# Patient Record
Sex: MALE | Race: WHITE | NOT HISPANIC OR LATINO | Employment: OTHER | URBAN - METROPOLITAN AREA
[De-identification: names, ages, dates, MRNs, and addresses within clinical notes are randomized per-mention and may not be internally consistent; named-entity substitution may affect disease eponyms.]

---

## 2024-10-04 ENCOUNTER — APPOINTMENT (OUTPATIENT)
Dept: RADIOLOGY | Facility: HOSPITAL | Age: 69
End: 2024-10-04
Payer: MEDICARE

## 2024-10-04 ENCOUNTER — HOSPITAL ENCOUNTER (OUTPATIENT)
Facility: HOSPITAL | Age: 69
Setting detail: OBSERVATION
End: 2024-10-04
Attending: EMERGENCY MEDICINE | Admitting: INTERNAL MEDICINE
Payer: MEDICARE

## 2024-10-04 DIAGNOSIS — I10 PRIMARY HYPERTENSION: ICD-10-CM

## 2024-10-04 DIAGNOSIS — M15.0 PRIMARY OSTEOARTHRITIS INVOLVING MULTIPLE JOINTS: ICD-10-CM

## 2024-10-04 DIAGNOSIS — R26.2 UNABLE TO AMBULATE: ICD-10-CM

## 2024-10-04 DIAGNOSIS — M17.12 PRIMARY OSTEOARTHRITIS OF LEFT KNEE: Primary | ICD-10-CM

## 2024-10-04 PROBLEM — M19.90 OSTEOARTHRITIS: Status: ACTIVE | Noted: 2024-10-04

## 2024-10-04 LAB
ALBUMIN SERPL BCP-MCNC: 3.9 G/DL (ref 3.4–5)
ALP SERPL-CCNC: 66 U/L (ref 33–136)
ALT SERPL W P-5'-P-CCNC: 16 U/L (ref 10–52)
ANION GAP SERPL CALC-SCNC: 12 MMOL/L (ref 10–20)
APPEARANCE UR: CLEAR
AST SERPL W P-5'-P-CCNC: 15 U/L (ref 9–39)
BASOPHILS # BLD AUTO: 0.04 X10*3/UL (ref 0–0.1)
BASOPHILS NFR BLD AUTO: 0.7 %
BILIRUB SERPL-MCNC: 0.8 MG/DL (ref 0–1.2)
BILIRUB UR STRIP.AUTO-MCNC: NEGATIVE MG/DL
BUN SERPL-MCNC: 14 MG/DL (ref 6–23)
CALCIUM SERPL-MCNC: 8.7 MG/DL (ref 8.6–10.3)
CHLORIDE SERPL-SCNC: 102 MMOL/L (ref 98–107)
CO2 SERPL-SCNC: 24 MMOL/L (ref 21–32)
COLOR UR: YELLOW
CREAT SERPL-MCNC: 1 MG/DL (ref 0.5–1.3)
EGFRCR SERPLBLD CKD-EPI 2021: 82 ML/MIN/1.73M*2
EOSINOPHIL # BLD AUTO: 0.01 X10*3/UL (ref 0–0.7)
EOSINOPHIL NFR BLD AUTO: 0.2 %
ERYTHROCYTE [DISTWIDTH] IN BLOOD BY AUTOMATED COUNT: 13.8 % (ref 11.5–14.5)
GLUCOSE SERPL-MCNC: 108 MG/DL (ref 74–99)
GLUCOSE UR STRIP.AUTO-MCNC: NORMAL MG/DL
HCT VFR BLD AUTO: 43.2 % (ref 41–52)
HGB BLD-MCNC: 14.5 G/DL (ref 13.5–17.5)
IMM GRANULOCYTES # BLD AUTO: 0.02 X10*3/UL (ref 0–0.7)
IMM GRANULOCYTES NFR BLD AUTO: 0.3 % (ref 0–0.9)
KETONES UR STRIP.AUTO-MCNC: NEGATIVE MG/DL
LEUKOCYTE ESTERASE UR QL STRIP.AUTO: NEGATIVE
LYMPHOCYTES # BLD AUTO: 0.88 X10*3/UL (ref 1.2–4.8)
LYMPHOCYTES NFR BLD AUTO: 14.9 %
MCH RBC QN AUTO: 29.5 PG (ref 26–34)
MCHC RBC AUTO-ENTMCNC: 33.6 G/DL (ref 32–36)
MCV RBC AUTO: 88 FL (ref 80–100)
MONOCYTES # BLD AUTO: 0.49 X10*3/UL (ref 0.1–1)
MONOCYTES NFR BLD AUTO: 8.3 %
NEUTROPHILS # BLD AUTO: 4.47 X10*3/UL (ref 1.2–7.7)
NEUTROPHILS NFR BLD AUTO: 75.6 %
NITRITE UR QL STRIP.AUTO: NEGATIVE
NRBC BLD-RTO: 0 /100 WBCS (ref 0–0)
PH UR STRIP.AUTO: 6.5 [PH]
PLATELET # BLD AUTO: 186 X10*3/UL (ref 150–450)
POTASSIUM SERPL-SCNC: 3.6 MMOL/L (ref 3.5–5.3)
PROT SERPL-MCNC: 6.9 G/DL (ref 6.4–8.2)
PROT UR STRIP.AUTO-MCNC: NEGATIVE MG/DL
RBC # BLD AUTO: 4.92 X10*6/UL (ref 4.5–5.9)
RBC # UR STRIP.AUTO: NEGATIVE /UL
SODIUM SERPL-SCNC: 134 MMOL/L (ref 136–145)
SP GR UR STRIP.AUTO: 1.01
UROBILINOGEN UR STRIP.AUTO-MCNC: ABNORMAL MG/DL
WBC # BLD AUTO: 5.9 X10*3/UL (ref 4.4–11.3)

## 2024-10-04 PROCEDURE — 2500000001 HC RX 250 WO HCPCS SELF ADMINISTERED DRUGS (ALT 637 FOR MEDICARE OP)

## 2024-10-04 PROCEDURE — 2500000005 HC RX 250 GENERAL PHARMACY W/O HCPCS

## 2024-10-04 PROCEDURE — 85025 COMPLETE CBC W/AUTO DIFF WBC: CPT

## 2024-10-04 PROCEDURE — 97161 PT EVAL LOW COMPLEX 20 MIN: CPT | Mod: GP

## 2024-10-04 PROCEDURE — G0378 HOSPITAL OBSERVATION PER HR: HCPCS

## 2024-10-04 PROCEDURE — 2500000004 HC RX 250 GENERAL PHARMACY W/ HCPCS (ALT 636 FOR OP/ED)

## 2024-10-04 PROCEDURE — 73560 X-RAY EXAM OF KNEE 1 OR 2: CPT | Mod: LEFT SIDE | Performed by: RADIOLOGY

## 2024-10-04 PROCEDURE — 96372 THER/PROPH/DIAG INJ SC/IM: CPT

## 2024-10-04 PROCEDURE — 84075 ASSAY ALKALINE PHOSPHATASE: CPT

## 2024-10-04 PROCEDURE — 73560 X-RAY EXAM OF KNEE 1 OR 2: CPT | Mod: LT

## 2024-10-04 PROCEDURE — 36415 COLL VENOUS BLD VENIPUNCTURE: CPT

## 2024-10-04 PROCEDURE — 99285 EMERGENCY DEPT VISIT HI MDM: CPT

## 2024-10-04 PROCEDURE — 2500000001 HC RX 250 WO HCPCS SELF ADMINISTERED DRUGS (ALT 637 FOR MEDICARE OP): Performed by: INTERNAL MEDICINE

## 2024-10-04 PROCEDURE — 99223 1ST HOSP IP/OBS HIGH 75: CPT

## 2024-10-04 PROCEDURE — 80053 COMPREHEN METABOLIC PANEL: CPT

## 2024-10-04 PROCEDURE — 81003 URINALYSIS AUTO W/O SCOPE: CPT

## 2024-10-04 PROCEDURE — 97165 OT EVAL LOW COMPLEX 30 MIN: CPT | Mod: GO

## 2024-10-04 RX ORDER — HYDROMORPHONE HYDROCHLORIDE 0.2 MG/ML
0.2 INJECTION INTRAMUSCULAR; INTRAVENOUS; SUBCUTANEOUS EVERY 6 HOURS PRN
Status: ACTIVE | OUTPATIENT
Start: 2024-10-04

## 2024-10-04 RX ORDER — CETIRIZINE HYDROCHLORIDE 10 MG/1
5 TABLET ORAL 2 TIMES DAILY
Status: DISPENSED | OUTPATIENT
Start: 2024-10-04

## 2024-10-04 RX ORDER — TRIAMCINOLONE ACETONIDE 1 MG/G
1 CREAM TOPICAL 2 TIMES DAILY
Status: ON HOLD | COMMUNITY
Start: 2024-10-03

## 2024-10-04 RX ORDER — KETOROLAC TROMETHAMINE 30 MG/ML
15 INJECTION, SOLUTION INTRAMUSCULAR; INTRAVENOUS ONCE
Status: COMPLETED | OUTPATIENT
Start: 2024-10-04 | End: 2024-10-04

## 2024-10-04 RX ORDER — ACETAMINOPHEN 325 MG/1
650 TABLET ORAL EVERY 4 HOURS PRN
Status: DISCONTINUED | OUTPATIENT
Start: 2024-10-04 | End: 2024-10-04

## 2024-10-04 RX ORDER — INSULIN LISPRO 100 [IU]/ML
0-10 INJECTION, SOLUTION INTRAVENOUS; SUBCUTANEOUS
Status: DISPENSED | OUTPATIENT
Start: 2024-10-04

## 2024-10-04 RX ORDER — ATORVASTATIN CALCIUM 40 MG/1
40 TABLET, FILM COATED ORAL NIGHTLY
Status: DISPENSED | OUTPATIENT
Start: 2024-10-04

## 2024-10-04 RX ORDER — TRIAMCINOLONE ACETONIDE 1 MG/G
1 CREAM TOPICAL 2 TIMES DAILY
Status: DISPENSED | OUTPATIENT
Start: 2024-10-04

## 2024-10-04 RX ORDER — OXYCODONE AND ACETAMINOPHEN 5; 325 MG/1; MG/1
1 TABLET ORAL EVERY 6 HOURS PRN
Status: ACTIVE | OUTPATIENT
Start: 2024-10-04

## 2024-10-04 RX ORDER — PREDNISONE 10 MG/1
TABLET ORAL SEE ADMIN INSTRUCTIONS
Status: ON HOLD | COMMUNITY
Start: 2024-10-03 | End: 2024-10-11

## 2024-10-04 RX ORDER — ENOXAPARIN SODIUM 100 MG/ML
40 INJECTION SUBCUTANEOUS EVERY 12 HOURS SCHEDULED
Status: DISPENSED | OUTPATIENT
Start: 2024-10-04

## 2024-10-04 RX ORDER — POLYETHYLENE GLYCOL 3350 17 G/17G
17 POWDER, FOR SOLUTION ORAL DAILY PRN
Status: ACTIVE | OUTPATIENT
Start: 2024-10-04

## 2024-10-04 RX ORDER — ACETAMINOPHEN 160 MG/5ML
650 SOLUTION ORAL EVERY 4 HOURS PRN
Status: DISCONTINUED | OUTPATIENT
Start: 2024-10-04 | End: 2024-10-04

## 2024-10-04 RX ORDER — ACETAMINOPHEN 650 MG/1
650 SUPPOSITORY RECTAL EVERY 4 HOURS
Status: ACTIVE | OUTPATIENT
Start: 2024-10-04

## 2024-10-04 RX ORDER — LIDOCAINE 560 MG/1
1 PATCH PERCUTANEOUS; TOPICAL; TRANSDERMAL ONCE
Status: COMPLETED | OUTPATIENT
Start: 2024-10-04 | End: 2024-10-04

## 2024-10-04 RX ORDER — ACETAMINOPHEN 325 MG/1
650 TABLET ORAL EVERY 4 HOURS
Status: DISPENSED | OUTPATIENT
Start: 2024-10-04

## 2024-10-04 RX ORDER — ACETAMINOPHEN 325 MG/1
975 TABLET ORAL ONCE
Status: COMPLETED | OUTPATIENT
Start: 2024-10-04 | End: 2024-10-04

## 2024-10-04 RX ORDER — ACETAMINOPHEN 160 MG/5ML
650 SOLUTION ORAL EVERY 4 HOURS
Status: ACTIVE | OUTPATIENT
Start: 2024-10-04

## 2024-10-04 RX ORDER — ACETAMINOPHEN 650 MG/1
650 SUPPOSITORY RECTAL EVERY 4 HOURS PRN
Status: DISCONTINUED | OUTPATIENT
Start: 2024-10-04 | End: 2024-10-04

## 2024-10-04 RX ORDER — DICLOFENAC SODIUM 10 MG/G
4 GEL TOPICAL 4 TIMES DAILY PRN
Status: DISPENSED | OUTPATIENT
Start: 2024-10-04

## 2024-10-04 RX ADMIN — DICLOFENAC SODIUM 4 G: 10 GEL TOPICAL at 21:28

## 2024-10-04 RX ADMIN — ACETAMINOPHEN 975 MG: 325 TABLET ORAL at 10:37

## 2024-10-04 RX ADMIN — KETOROLAC TROMETHAMINE 15 MG: 30 INJECTION, SOLUTION INTRAMUSCULAR at 10:37

## 2024-10-04 RX ADMIN — CETIRIZINE HYDROCHLORIDE 5 MG: 10 TABLET, FILM COATED ORAL at 21:30

## 2024-10-04 RX ADMIN — ENOXAPARIN SODIUM 40 MG: 40 INJECTION SUBCUTANEOUS at 21:28

## 2024-10-04 RX ADMIN — LIDOCAINE 4% 1 PATCH: 40 PATCH TOPICAL at 10:37

## 2024-10-04 RX ADMIN — DICLOFENAC SODIUM 4 G: 10 GEL TOPICAL at 21:29

## 2024-10-04 RX ADMIN — ACETAMINOPHEN 650 MG: 325 TABLET ORAL at 21:30

## 2024-10-04 RX ADMIN — ATORVASTATIN CALCIUM 40 MG: 40 TABLET, FILM COATED ORAL at 21:28

## 2024-10-04 SDOH — SOCIAL STABILITY: SOCIAL INSECURITY: WERE YOU ABLE TO COMPLETE ALL THE BEHAVIORAL HEALTH SCREENINGS?: YES

## 2024-10-04 SDOH — SOCIAL STABILITY: SOCIAL INSECURITY: ABUSE: ADULT

## 2024-10-04 SDOH — SOCIAL STABILITY: SOCIAL INSECURITY: DO YOU FEEL ANYONE HAS EXPLOITED OR TAKEN ADVANTAGE OF YOU FINANCIALLY OR OF YOUR PERSONAL PROPERTY?: NO

## 2024-10-04 SDOH — SOCIAL STABILITY: SOCIAL INSECURITY: DO YOU FEEL UNSAFE GOING BACK TO THE PLACE WHERE YOU ARE LIVING?: NO

## 2024-10-04 SDOH — SOCIAL STABILITY: SOCIAL INSECURITY: HAVE YOU HAD THOUGHTS OF HARMING ANYONE ELSE?: NO

## 2024-10-04 SDOH — SOCIAL STABILITY: SOCIAL INSECURITY: HAVE YOU HAD ANY THOUGHTS OF HARMING ANYONE ELSE?: NO

## 2024-10-04 SDOH — SOCIAL STABILITY: SOCIAL INSECURITY: DOES ANYONE TRY TO KEEP YOU FROM HAVING/CONTACTING OTHER FRIENDS OR DOING THINGS OUTSIDE YOUR HOME?: NO

## 2024-10-04 SDOH — SOCIAL STABILITY: SOCIAL INSECURITY: HAS ANYONE EVER THREATENED TO HURT YOUR FAMILY OR YOUR PETS?: NO

## 2024-10-04 SDOH — SOCIAL STABILITY: SOCIAL INSECURITY: ARE THERE ANY APPARENT SIGNS OF INJURIES/BEHAVIORS THAT COULD BE RELATED TO ABUSE/NEGLECT?: NO

## 2024-10-04 SDOH — SOCIAL STABILITY: SOCIAL INSECURITY: ARE YOU OR HAVE YOU BEEN THREATENED OR ABUSED PHYSICALLY, EMOTIONALLY, OR SEXUALLY BY ANYONE?: NO

## 2024-10-04 ASSESSMENT — COGNITIVE AND FUNCTIONAL STATUS - GENERAL
DAILY ACTIVITIY SCORE: 13
TOILETING: A LITTLE
TOILETING: A LOT
MOVING TO AND FROM BED TO CHAIR: A LOT
CLIMB 3 TO 5 STEPS WITH RAILING: TOTAL
DRESSING REGULAR LOWER BODY CLOTHING: A LOT
TURNING FROM BACK TO SIDE WHILE IN FLAT BAD: A LOT
DAILY ACTIVITIY SCORE: 19
WALKING IN HOSPITAL ROOM: TOTAL
DRESSING REGULAR UPPER BODY CLOTHING: A LITTLE
EATING MEALS: A LITTLE
MOBILITY SCORE: 11
HELP NEEDED FOR BATHING: A LOT
MOBILITY SCORE: 10
STANDING UP FROM CHAIR USING ARMS: A LOT
MOVING FROM LYING ON BACK TO SITTING ON SIDE OF FLAT BED WITH BEDRAILS: A LITTLE
DRESSING REGULAR UPPER BODY CLOTHING: A LOT
STANDING UP FROM CHAIR USING ARMS: A LOT
PERSONAL GROOMING: A LITTLE
MOVING TO AND FROM BED TO CHAIR: A LOT
DRESSING REGULAR LOWER BODY CLOTHING: TOTAL
PATIENT BASELINE BEDBOUND: NO
CLIMB 3 TO 5 STEPS WITH RAILING: TOTAL
WALKING IN HOSPITAL ROOM: TOTAL
TURNING FROM BACK TO SIDE WHILE IN FLAT BAD: A LOT
HELP NEEDED FOR BATHING: A LITTLE
MOVING FROM LYING ON BACK TO SITTING ON SIDE OF FLAT BED WITH BEDRAILS: A LOT

## 2024-10-04 ASSESSMENT — LIFESTYLE VARIABLES
TOTAL SCORE: 0
EVER FELT BAD OR GUILTY ABOUT YOUR DRINKING: NO
HOW OFTEN DO YOU HAVE 6 OR MORE DRINKS ON ONE OCCASION: NEVER
AUDIT-C TOTAL SCORE: 0
HAVE PEOPLE ANNOYED YOU BY CRITICIZING YOUR DRINKING: NO
AUDIT-C TOTAL SCORE: 0
SUBSTANCE_ABUSE_PAST_12_MONTHS: NO
HOW MANY STANDARD DRINKS CONTAINING ALCOHOL DO YOU HAVE ON A TYPICAL DAY: PATIENT DOES NOT DRINK
PRESCIPTION_ABUSE_PAST_12_MONTHS: NO
EVER HAD A DRINK FIRST THING IN THE MORNING TO STEADY YOUR NERVES TO GET RID OF A HANGOVER: NO
SKIP TO QUESTIONS 9-10: 1
HAVE YOU EVER FELT YOU SHOULD CUT DOWN ON YOUR DRINKING: NO
HOW OFTEN DO YOU HAVE A DRINK CONTAINING ALCOHOL: NEVER

## 2024-10-04 ASSESSMENT — ACTIVITIES OF DAILY LIVING (ADL)
GROOMING: INDEPENDENT
TOILETING: NEEDS ASSISTANCE
DRESSING YOURSELF: NEEDS ASSISTANCE
ASSISTIVE_DEVICE: WALKER
JUDGMENT_ADEQUATE_SAFELY_COMPLETE_DAILY_ACTIVITIES: YES
ADEQUATE_TO_COMPLETE_ADL: YES
BATHING: NEEDS ASSISTANCE
LACK_OF_TRANSPORTATION: NO
WALKS IN HOME: NEEDS ASSISTANCE
HEARING - RIGHT EAR: FUNCTIONAL
FEEDING YOURSELF: INDEPENDENT
HEARING - LEFT EAR: FUNCTIONAL
PATIENT'S MEMORY ADEQUATE TO SAFELY COMPLETE DAILY ACTIVITIES?: YES

## 2024-10-04 ASSESSMENT — PATIENT HEALTH QUESTIONNAIRE - PHQ9
1. LITTLE INTEREST OR PLEASURE IN DOING THINGS: NOT AT ALL
SUM OF ALL RESPONSES TO PHQ9 QUESTIONS 1 & 2: 0
2. FEELING DOWN, DEPRESSED OR HOPELESS: NOT AT ALL

## 2024-10-04 ASSESSMENT — COLUMBIA-SUICIDE SEVERITY RATING SCALE - C-SSRS
6. HAVE YOU EVER DONE ANYTHING, STARTED TO DO ANYTHING, OR PREPARED TO DO ANYTHING TO END YOUR LIFE?: NO
2. HAVE YOU ACTUALLY HAD ANY THOUGHTS OF KILLING YOURSELF?: NO
1. IN THE PAST MONTH, HAVE YOU WISHED YOU WERE DEAD OR WISHED YOU COULD GO TO SLEEP AND NOT WAKE UP?: NO

## 2024-10-04 NOTE — ED PROVIDER NOTES
"Emergency Department Provider Note        History of Present Illness     History provided by: Patient  Limitations to History: None  External Records Reviewed with Brief Summary: Outpatient progress note from 10/3/24 which showed visit with PCP with noted L knee weakness, determined 2/2 cerebral palsy and brace ordered    HPI:  Dez Bonilla is a 69 y.o. male with PMHx palsy, T2DM, osteoarthritis, HLD who presents to the emergency department for left knee pain starting this morning.  Per patient, he has some chronic knee pain, however this morning when he woke up his pain was more severe and he was unable to walk.  He reports he feels like the knee may buckle underneath him.  He reports no recent trauma or injuries.  Patient walks with a walker at baseline.  Patient does state that he \"has no cartilage in that knee.\"  Denies prior surgeries or procedures on the knee.    While resting in bed patient reports no pain, however reports that when trying to stand pain was 9.5/10.    No fever/chills at home, no cough/congestion, no shortness of breath, no chest pain, no nausea/vomiting, no abdominal pain, no dysuria, no leg swelling.  Does report that he had his COVID and flu shots yesterday.    Physical Exam   Triage vitals:  T 37 °C (98.6 °F)  HR 92  /81  RR 18  O2 95 %      Physical exam:   Triage vitals reviewed.  Constitutional: Well developed adult in no acute distress, non toxic in appearance  Head: Normocephalic, atraumatic  Skin: Intact, dry. No rashes or lesions.  Eyes: Pupils are equal, reactive to light bilaterally. EOMI without nystagmus. No conjunctival injection.  Neck: Supple. Trachea is midline.  Pulmonary: Normal work of breathing with no accessory muscle use noted.  Clear to auscultation bilaterally.   Cardiovascular: Normal rate, regular rhythm. No murmurs/gallops/rubs appreciated. 2+ radial and DP pulses bilaterally.   Abdomen: Soft, nondistended. Nontender to palpation.  Extremities: No " gross deformities.  Strength 5/5 in BUE and RLE, strength 4+/5 in LLE hip and knee, 5/5 plantarflexion and dorsiflexion.  ROM of left knee intact, nonpainful without weightbearing.  No tenderness to palpation.  Neuro: Awake and alert. Answers all questions appropriately. Speech is clear. Face is symmetric without facial droop and facial sensation to light touch equal bilaterally. Uvula midline. Tongue protrusion midline. Hearing intact bilaterally. Full and equal shoulder shrug. Sensation to light touch intact in all four extremities. No pronator drift.      Medical Decision Making & ED Course   Medical Decision Makin y.o. male with PMHx cerebral palsy, T2DM who presents to ED for atraumatic L knee pain.  On arrival, the patient was afebrile and hemodynamically stable.  On exam, he is able to range the left knee as long as he does not put any weight on it.  LLE at the hip flexor and knee is 4+/5, plantarflexion/dorsiflexion 5/5.     Given inability to bear weight, will obtain plain films of the left knee. Will trial tylenol, toradol, lido patch and reambulation.    Low concern for septic joint as patient is able to range the knee while not bearing weight and has no tenderness to palpation, no erythema or edema. Low concern for stroke as patient does have distal strength intact and no other neurologic deficits on exam.     Patient's sister at bedside to give additional history.  She believes the patient is not taking good care of himself at home.  We have previously discussed placement in assisted living with the patient's PCP.  Basic labs obtained as well as UA due to sister's report of increasing confusion versus inability to care for self at home.    Social work was consulted and came to bedside to evaluate the patient and family.  Patient and family were provided with resources, as well as paperwork to establish healthcare power of .     PT/OT eval the patient and recommended SNF with moderate  intensity therapy.  Patient is in agreement with placement.    Discussed with Dr. Crowley, patient to be admitted for observation with plan for PT/OT and placement.    ----    Differential diagnoses considered include but are not limited to: Fracture, osteoarthritis, inflammatory arthritis, septic joint, metabolic derangement     Social Determinants of Health which Significantly Impact Care: Difficulty paying for/obtaining medications, Housing insecurity, and Poor housing conditions The following actions were taken to address these social determinants: Patient offered evaluation by Social Work    EKG Independent Interpretation: EKG not obtained    Independent Result Review and Interpretation: Relevant laboratory and radiographic results were reviewed and independently interpreted by myself.  As necessary, they are commented on in the ED Course.    Chronic conditions affecting the patient's care: As documented above in [facility]    The patient was discussed with the following consultants/services: Hospitalist/Admitting Provider who accepted the patient for admission and Social work , PT/OT    Care Considerations: As documented above in [facility]    ED Course:  ED Course as of 10/04/24 1640   Fri Oct 04, 2024   1216 XR knee left 1-2 views  CXR with marked vdegenerative changes, no fracture or subluxation. [HH]   1521 OT/PT recommended moderate intensity, PT recommended staff assist x2 for transfers [HH]   1558 CBC, CMP, and UA unremarkable [HH]      ED Course User Index  [HH] Deb Hansen MD         Diagnoses as of 10/04/24 1640   Primary osteoarthritis of left knee   Unable to ambulate     Disposition   As a result of their workup, the patient will require admission to the hospital.  The patient was informed of his diagnosis.  The patient was given the opportunity to ask questions and I answered them. The patient agreed to be admitted to the hospital.      Patient seen and discussed with ED attending physician.    Deb  MD [facility]  Emergency Medicine     Deb Hansen MD  Resident  10/04/24 1640

## 2024-10-04 NOTE — H&P
"  Subjective/History     Dez Bonilla is a 69 y.o. male with a history of cerebral palsy, developmental delay, HLD, osteoarthritis, testosterone deficiency, and T2DM who presented to the ED for left knee pain starting this morning.  He has chronic knee pain however this morning when he woke up the pain became more severe and he was unable to walk.  When trying to stand patient reports 9.5/10 pain. Per patient's sister, there is concern for bedbug infestation in his apartment.      ED Course: ED vitals unremarkable.  Labs notable for Na 134, UA notable for 1+ urobilinogen.  Patient was placed on contact plus isolation for bedbug infestation.    Surgical history: As below  Family history: As below  Risk/Social factors:     No past medical history on file.    No past surgical history on file.    Family history:family history is not on file.    ROS  A 12 point review of systems was performed and all systems were negative/normal except what is noted in the HPI. Please refer to the HPI for details.    Objective     Physical Exam:  General:  Pleasant and cooperative. No apparent distress.  HEENT:  Normocephalic, atraumatic  Chest:  Clear to auscultation bilaterally. No wheezes, rales, or rhonchi.  CV:  Regular rate and rhythm. No murmurs    Abdomen: Abdomen is soft, non-tender, non-distended. BS +   Extremities:  No lower extremity edema or cyanosis.   Neurological:  AAOx3. No focal deficits.  Skin:  Warm and dry. Red papular rash noted over arms, as well as white macules.    Last Recorded Vitals  Blood pressure 140/81, pulse 92, temperature 37 °C (98.6 °F), resp. rate 18, height 1.651 m (5' 5\"), weight 113 kg (250 lb), SpO2 95%.  Intake/Output last 3 Shifts:  No intake/output data recorded.    Last CBC & BMP  Lab Results   Component Value Date    GLUCOSE 108 (H) 10/04/2024    CALCIUM 8.7 10/04/2024     (L) 10/04/2024    K 3.6 10/04/2024    CO2 24 10/04/2024     10/04/2024    BUN 14 10/04/2024    CREATININE " "1.00 10/04/2024     Lab Results   Component Value Date    WBC 5.9 10/04/2024    HGB 14.5 10/04/2024    HCT 43.2 10/04/2024    MCV 88 10/04/2024     10/04/2024         Assessment / Plan          Acute Medical conditions    #Reduced mobility of L knee in the setting of osteoarthritis and cerebral palsy  Plan:  -Patient reports that his left knee \"won't lock.\" He says that he has severe pain when attempting to stand on his leg, but has no pain at bedrest or on palpation.  - PT/OT eval ordered, PT score 10, OT score 13.   met with patient's sister to discuss assisted living placement and becoming POA.  - Patient given lidocaine patch, scheduled tylenol for knee pain. PRN percocet and dilaudid for moderate and severe pain respectively. Voltaren QID PRN ordered.    #Red papular rash over BUE  #White macules over BUE  #C/f bedbug infestation  Plan:  -Patient presents with a red rash over his arms.  Sister notes that he has bedbugs in his apartment and hoards objects.  She also notes that he does not follow typical hygiene standards.  - Primary care doctor was seen yesterday, prescribed a course of topical Kenalog.  Continuing Kenalog BID.      Chronic Medical conditions  #T2DM  #HLD  Plan:  -Patient states he was taking glipizide, lisinopril, and atorvastatin.  Per chart review he has not taken these in 6 months due to transportation issues to the pharmacy.   - HbA1c was rechecked at outpatient visit yesterday, 5.9%.  - Lipid profile shows , non-.  Resuming atorvastatin 40mg.  - SSI 2 lispro started, monitor glucose.  - Patient also has prescriptions for lisinopril 20mg daily, glipizide 10mg daily, and Lasix 20mg daily.    DVT: Lovenox  IVF: -  Diet: Adult carb controlled  Code: DNR  Consults:     Care Planning/PT-OT: PT/OT eval ordered       Noel Bunch MD   PGY-1, Internal Medicine  This is a preliminary note, please await attending attestation for final A/P    "

## 2024-10-04 NOTE — PROGRESS NOTES
Pharmacy Medication History Review    Dez Bonilla is a 69 y.o. male admitted for No Principal Problem: There is no principal problem currently on the Problem List. Please update the Problem List and refresh.. Pharmacy reviewed the patient's [id number] medications and allergies for accuracy.    The list below reflectives the updated PTA list. Please review each medication in order reconciliation for additional clarification and justification.  Prior to Admission medications    Medication Sig Start Date End Date Taking? Authorizing Provider   predniSONE (Deltasone) 10 mg tablet see administration instructions. Take 4 tablets once a day by mouth x 3 days, then 2 tabs once a day x 3 days, then 1 tab x 3 days 10/3/24 10/11/24 Yes Historical Provider, MD   triamcinolone (Kenalog) 0.1 % cream Apply 1 Application topically twice a day. Apply thin layer 10/3/24  Yes Historical Provider, MD        The list below reflectives the updated allergy list. Please review each documented allergy for additional clarification and justification.  Allergies  Reviewed by Deb Hansen MD on 10/4/2024        Severity Reactions Comments    Codeine High Anaphylaxis     Shellfish Derived High Nausea/vomiting             Below are additional concerns with the patient's PTA list.    Prescriptions for Lisinopril 20 mg daily, glipizide ER 10 mg daily and Lasix 20 mg daily last filled December 2023 for 90 day supply.        Tamanna Zapien

## 2024-10-04 NOTE — PROGRESS NOTES
Occupational Therapy    Evaluation    Patient Name: Dez Bonilla  MRN: [id number]  Today's Date: 10/4/2024  Time Calculation  Start Time: 1434  Stop Time: 1454  Time Calculation (min): 20 min    Assessment  IP OT Assessment  OT Assessment: Patient presents with a decline in functional status and is at high risk of falls; patient would benefit from 24 hour care with O.T. services at a moderate intensity to improve independence with ADLs, transfers & mobility.  Prognosis: Good  Barriers to Discharge: Decreased caregiver support  End of Session Patient Position:  (on ED cart, 2 rails up, posey alarm on, call light in reach)    Plan:  Treatment Interventions: ADL retraining, Functional transfer training, UE strengthening/ROM, Neuromuscular reeducation, Compensatory technique education  OT Frequency: 3 times per week  OT Discharge Recommendations: Moderate intensity level of continued care, 24 hr supervision due to cognition  OT - OK to Discharge: Yes (from an O.T. standpoint)    Subjective     Current Problem:  There is no problem list on file for this patient.      General:  General  Reason for Referral: OT eval & treat for ADLs/safety  Referred By: Jamal Epperson  Past Medical History Relevant to Rehab: 10/4/24: L knee pain, weakness; was seen at PCP office on 10/3/24 with complaints of atraumatic knee pain, found to have bedbugs; PCP may have ordered a brace. X-ray: no acute findings; (PMH: Cerebral palsy,CHF, HLD, OA, DM II)  Co-Treatment: PT  Co-Treatment Reason: to maximize patient safety & mobility  Prior to Session Communication: Bedside nurse  Patient Position Received:  (on ED cart with 2 rails up, alarm on)  General Comment: Per conference with RN, patient is medically stable for therapy eval    Precautions:  Precautions Comment: contact precautions, fall/safety, posey alarm      Pain:  Pain Assessment  Pain Assessment:  (no complaints of pain)    Objective     Cognition:  Overall Cognitive Status:  (A & O x  "2-3, states \"[facility]\" for place; questionable historian, impaired direction following, impaired safety judgement, max cues for safety)           Home Living:  Home Living Comments: Patient lives home alone in apartment with elevator; Patient states he was independent ADLs, transfers & mobility with rollator; independent IADLs, uses senior bus for transportation.  Sister in area supportive.          ADL:  Grooming Assistance: Minimal  UE Dressing Assistance: Minimal  LE Dressing Assistance: Total  Toileting Assistance with Device: Maximal  ADL Comments: Extensive assist for all ADLs secondary to impaired balance, impaired ROM/flexibility and body habitus. Per RN, patient was very unkempt upon arrival to hospital.    Activity Tolerance:  Endurance: Decreased tolerance for upright activites    Bed Mobility/Transfers:   Bed Mobility  Bed Mobility:  (max assist x 1 supine to sit; max assist x 2 sit to supine)  Transfers  Transfer:  (mod assist x 1 sit to stand from edge of ED cart with wheeled walker; max verbal and tactile cues for safety/hand placement)    Ambulation/Gait Training:  Functional Mobility  Functional Mobility Performed:  (mod assist x 1 with wheeled walker to take lateral steps at edge of bed with wheeled walker.)    Sitting Balance:  Dynamic Sitting Balance  Dynamic Sitting-Balance Support: Bilateral upper extremity supported  Dynamic Sitting-Level of Assistance: Minimum assistance    Standing Balance:  Dynamic Standing Balance  Dynamic Standing-Balance Support: Bilateral upper extremity supported  Dynamic Standing-Level of Assistance: Moderate assistance      Sensation:  Light Touch: No apparent deficits    Strength:  Strength Comments: BUE AROM WFL, MMT 4-/5      Coordination:  Coordination Comment: BUE finger opposition grossly WFL; patient reports impaired functional use of LUE due to impaired coordination     Outcome Measures: [facility] Daily Activity  Putting on and taking off regular lower body " clothing: Total  Bathing (including washing, rinsing, drying): A lot  Putting on and taking off regular upper body clothing: A lot  Toileting, which includes using toilet, bedpan or urinal: A lot  Taking care of personal grooming such as brushing teeth: A little  Eating Meals: A little  Daily Activity - Total Score: 13                  EDUCATION:     Education Documentation  ADL Training, taught by Iqra Irwin OT at 10/4/2024  3:18 PM.  Learner: Patient  Readiness: Acceptance  Method: Explanation, Demonstration  Response: Needs Reinforcement    Education Comments  No comments found.        Goals:   Encounter Problems       Encounter Problems (Active)       OT Goals       Increase bed mobility & functional transfers to/from bed, chair & commode to CGA  with DME for safety  (Progressing)       Start:  10/04/24    Expected End:  10/18/24            Increase UE bathing/dressing to SBA and LE bathing/dressing to mod assist with adaptive equipment as needed.  (Progressing)       Start:  10/04/24    Expected End:  10/18/24            Increase dynamic sit balance to supervision and dynamic stand balance to CGA with UE support to promote increased independence with ADL & functional transfers  (Progressing)       Start:  10/04/24    Expected End:  10/18/24            Tolerate 15 minutes UE therex with rest periods prn to promote increased activity tolerance for ADLs  (Progressing)       Start:  10/04/24    Expected End:  10/18/24

## 2024-10-04 NOTE — PROGRESS NOTES
Physical Therapy    Physical Therapy Evaluation    Patient Name: Dez Bonilla  MRN: [id number]  Today's Date: 10/4/2024   Time Calculation  Start Time: 1434  Stop Time: 1454  Time Calculation (min): 20 min  AC14/AC14    Assessment/Plan   PT Assessment  PT Assessment Results: Decreased strength, Decreased range of motion, Decreased endurance, Impaired balance, Decreased mobility, Decreased safety awareness  Rehab Prognosis: Good  Barriers to Discharge: lives alone  Evaluation/Treatment Tolerance: Patient limited by fatigue  Assessment Comment: AT baseline pt independent w/ mobility, presently requires mod/max assist x1-2. Continued skilled PT intervention indicated to facilitate increased strength, balance & gait stability for return to PLOF  End of Session Patient Position: On cart, Alarm on (hob elevated to comfort, call light in reach)  IP OR SWING BED PT PLAN  Inpatient or Swing Bed: Inpatient  PT Plan  Treatment/Interventions: Bed mobility, Transfer training, Gait training, Balance training, Therapeutic exercise, Therapeutic activity  PT Plan: Ongoing PT  PT Frequency: 3 times per week  PT Discharge Recommendations: Moderate intensity level of continued care  PT Recommended Transfer Status: Assist x2  PT - OK to Discharge: Yes (when cleared by medical team)     Past Medical HIstory:          Diagnosis     Cerebral palsy (HCC)      Developmental delay      Hyperlipidemia      Osteoarthritis       knees, R hip    Testosterone deficiency      Type 2 diabetes mellitus (HCC)      Vitamin D deficiency        Current Problem:  No diagnosis found.  There is no problem list on file for this patient.      General Visit Information:  General  Reason for Referral: PT eval & treat/impaired mobility; DX: lt knee pain & weakness; 10/4/24 c/o atraumatic lt knee pain, brace ordered at PCP office not yet received; xray lt knee: mild degenerative changes  Referred By: Jamal Epperson  Caregiver Feedback: Per conference w/ RN patient  stable to participate in therapy  Co-Treatment: OT  Co-Treatment Reason: to maximize pt safety & mobility  Patient Position Received:  (on ED cart bilat rails up, alarm on)  General Comment: Pleasant & cooperative, receptive to mobility& instructions; required max cues for safety, decreased direction following, distractible, questionable historian    Home Living:  Home Living  Home Living Comments: Lives alone, sister local & supportive; lives in apartment w/ elevator access    Prior Level of Function:  Prior Function Per Pt/Caregiver Report  Prior Function Comments: independent mobility w/ use of rollator, independent adl & iadl; senior bus for transportation/pt does his own  shopping    Precautions:  Precautions  Precautions Comment: fall, alarm, contact/bedbugs, corrective lenses  Pain:  Pain Assessment  Pain Assessment:  (denies c/o pain)    Cognition:  Cognition  Overall Cognitive Status:  (a&ox2-3 reports being at [facility])    Functional Assessments:     Bed Mobility  Bed Mobility:  (max assist x1 supine>sit, max assist x2 sit>supine)  Transfers  Transfer:  (mod assist x1 sit>stand from elevated ht of ED cart, mod assist x1 stand>sit w/ cues for safe positioning back to ED cart & for safe hand plcmt)  Ambulation/Gait Training  Ambulation/Gait Training Performed:  (mod assist x1 w/ ww 1step forward/1step backward, 3steps sidestepping lt toward hob, cues for maintaining hands to ww, assist to manage ww, cues to advance le's; unstable le's/fall risk)          Extremity/Trunk Assessments:        RLE   RLE :  (end rom tightness heelcord & hamstrings but arom grossly wfl; strength: hip flexion 3+/5 knee ext 4/5 ankle df 3+/5 variable effort w/ MMT)  LLE   LLE :  (end rom tightness heelcord & hamstrings  knee extension minus ~30degrees; strength: hip flexion 3+/5 knee ext 3+/5 ankle df 3+/5 variable effort w/ MMT)    Outcome Measures:     [facility] Basic Mobility  Turning from your back to your side while in a flat bed  without using bedrails: A lot  Moving from lying on your back to sitting on the side of a flat bed without using bedrails: A lot  Moving to and from bed to chair (including a wheelchair): A lot  Standing up from a chair using your arms (e.g. wheelchair or bedside chair): A lot  To walk in hospital room: Total  Climbing 3-5 steps with railing: Total  Basic Mobility - Total Score: 10     Goals:  Encounter Problems       Encounter Problems (Active)       PT Problem       STG - Pt will transition supine <> sitting with cga  (Progressing)       Start:  10/04/24    Expected End:  10/18/24            STG - Pt will transfer STS with cga  (Progressing)       Start:  10/04/24    Expected End:  10/18/24            STG - Pt will amb >=40' using ww with cga  (Progressing)       Start:  10/04/24    Expected End:  10/18/24            STG - Pt will perform a B LE ther ex program of 2-3 sets of 10  (Progressing)       Start:  10/04/24    Expected End:  10/18/24                 Education Documentation  Mobility Training, taught by Ree Lemons PT at 10/4/2024  3:15 PM.  Learner: Patient  Readiness: Acceptance  Method: Explanation  Response: Verbalizes Understanding, Needs Reinforcement  Comment: safety, activity progression

## 2024-10-04 NOTE — PROGRESS NOTES
LSW met with PT's sister in [facility] to review social concerns. PT resides home alone, reported to have concerns with pest, hoarding and a pending eviction. Sister further reports PT is frequently confused, unable to handle his own finances and has limitations with working his cell phone. Sister also shares she has been in contact with [facility] as she believes the PT is unable to remain in his home alone safely. LSW advise the sister to follow the directives of the admission office at the facilities. LSW further educated the sister, the PT would need to have a 30 visit with his PCP for needed admission paperwork for either facility of choice. Sister is inquiring about becoming a POA. LSW shares this paperwork could be provided here today. LSW dd discuss with the sister POA paperwork could not be completed if the PT is not consenting or not of sound mind to consent. PT does have a  in the community through [facility] to assist further with social needs.   Leda Bowen, YAHAIRAW, MSW

## 2024-10-05 LAB
ANION GAP SERPL CALC-SCNC: 12 MMOL/L (ref 10–20)
BUN SERPL-MCNC: 13 MG/DL (ref 6–23)
CALCIUM SERPL-MCNC: 8.5 MG/DL (ref 8.6–10.3)
CHLORIDE SERPL-SCNC: 104 MMOL/L (ref 98–107)
CO2 SERPL-SCNC: 25 MMOL/L (ref 21–32)
CREAT SERPL-MCNC: 0.84 MG/DL (ref 0.5–1.3)
EGFRCR SERPLBLD CKD-EPI 2021: >90 ML/MIN/1.73M*2
ERYTHROCYTE [DISTWIDTH] IN BLOOD BY AUTOMATED COUNT: 13.9 % (ref 11.5–14.5)
GLUCOSE BLD MANUAL STRIP-MCNC: 100 MG/DL (ref 74–99)
GLUCOSE BLD MANUAL STRIP-MCNC: 103 MG/DL (ref 74–99)
GLUCOSE BLD MANUAL STRIP-MCNC: 110 MG/DL (ref 74–99)
GLUCOSE SERPL-MCNC: 94 MG/DL (ref 74–99)
HCT VFR BLD AUTO: 44.2 % (ref 41–52)
HGB BLD-MCNC: 14 G/DL (ref 13.5–17.5)
HOLD SPECIMEN: NORMAL
HOLD SPECIMEN: NORMAL
MAGNESIUM SERPL-MCNC: 2.01 MG/DL (ref 1.6–2.4)
MCH RBC QN AUTO: 29.2 PG (ref 26–34)
MCHC RBC AUTO-ENTMCNC: 31.7 G/DL (ref 32–36)
MCV RBC AUTO: 92 FL (ref 80–100)
NRBC BLD-RTO: 0 /100 WBCS (ref 0–0)
PLATELET # BLD AUTO: 152 X10*3/UL (ref 150–450)
POTASSIUM SERPL-SCNC: 3.8 MMOL/L (ref 3.5–5.3)
RBC # BLD AUTO: 4.8 X10*6/UL (ref 4.5–5.9)
SODIUM SERPL-SCNC: 137 MMOL/L (ref 136–145)
WBC # BLD AUTO: 4.3 X10*3/UL (ref 4.4–11.3)

## 2024-10-05 PROCEDURE — 85027 COMPLETE CBC AUTOMATED: CPT

## 2024-10-05 PROCEDURE — 80048 BASIC METABOLIC PNL TOTAL CA: CPT

## 2024-10-05 PROCEDURE — 96372 THER/PROPH/DIAG INJ SC/IM: CPT

## 2024-10-05 PROCEDURE — 2500000001 HC RX 250 WO HCPCS SELF ADMINISTERED DRUGS (ALT 637 FOR MEDICARE OP): Performed by: INTERNAL MEDICINE

## 2024-10-05 PROCEDURE — 36415 COLL VENOUS BLD VENIPUNCTURE: CPT

## 2024-10-05 PROCEDURE — 82947 ASSAY GLUCOSE BLOOD QUANT: CPT

## 2024-10-05 PROCEDURE — 99232 SBSQ HOSP IP/OBS MODERATE 35: CPT

## 2024-10-05 PROCEDURE — G0378 HOSPITAL OBSERVATION PER HR: HCPCS

## 2024-10-05 PROCEDURE — 83735 ASSAY OF MAGNESIUM: CPT

## 2024-10-05 PROCEDURE — 82374 ASSAY BLOOD CARBON DIOXIDE: CPT

## 2024-10-05 PROCEDURE — 99221 1ST HOSP IP/OBS SF/LOW 40: CPT | Performed by: ORTHOPAEDIC SURGERY

## 2024-10-05 PROCEDURE — 2500000001 HC RX 250 WO HCPCS SELF ADMINISTERED DRUGS (ALT 637 FOR MEDICARE OP)

## 2024-10-05 PROCEDURE — 2500000004 HC RX 250 GENERAL PHARMACY W/ HCPCS (ALT 636 FOR OP/ED)

## 2024-10-05 RX ORDER — HYDRALAZINE HYDROCHLORIDE 20 MG/ML
5 INJECTION INTRAMUSCULAR; INTRAVENOUS EVERY 4 HOURS PRN
Status: ACTIVE | OUTPATIENT
Start: 2024-10-05

## 2024-10-05 RX ORDER — LISINOPRIL 10 MG/1
10 TABLET ORAL DAILY
Status: DISPENSED | OUTPATIENT
Start: 2024-10-05

## 2024-10-05 RX ORDER — LIDOCAINE 560 MG/1
1 PATCH PERCUTANEOUS; TOPICAL; TRANSDERMAL DAILY
Status: DISPENSED | OUTPATIENT
Start: 2024-10-05

## 2024-10-05 RX ORDER — AMLODIPINE BESYLATE 5 MG/1
5 TABLET ORAL DAILY
Status: DISPENSED | OUTPATIENT
Start: 2024-10-05

## 2024-10-05 RX ADMIN — ENOXAPARIN SODIUM 40 MG: 40 INJECTION SUBCUTANEOUS at 09:08

## 2024-10-05 RX ADMIN — CETIRIZINE HYDROCHLORIDE 5 MG: 10 TABLET, FILM COATED ORAL at 09:08

## 2024-10-05 RX ADMIN — LISINOPRIL 10 MG: 10 TABLET ORAL at 13:31

## 2024-10-05 RX ADMIN — ENOXAPARIN SODIUM 40 MG: 40 INJECTION SUBCUTANEOUS at 21:42

## 2024-10-05 RX ADMIN — AMLODIPINE BESYLATE 5 MG: 5 TABLET ORAL at 13:31

## 2024-10-05 RX ADMIN — ACETAMINOPHEN 650 MG: 325 TABLET ORAL at 06:40

## 2024-10-05 RX ADMIN — ATORVASTATIN CALCIUM 40 MG: 40 TABLET, FILM COATED ORAL at 21:42

## 2024-10-05 RX ADMIN — ACETAMINOPHEN 650 MG: 325 TABLET ORAL at 21:42

## 2024-10-05 RX ADMIN — ACETAMINOPHEN 650 MG: 325 TABLET ORAL at 10:52

## 2024-10-05 RX ADMIN — CETIRIZINE HYDROCHLORIDE 5 MG: 10 TABLET, FILM COATED ORAL at 21:42

## 2024-10-05 ASSESSMENT — PAIN SCALES - GENERAL
PAINLEVEL_OUTOF10: 3
PAINLEVEL_OUTOF10: 4
PAINLEVEL_OUTOF10: 0 - NO PAIN
PAINLEVEL_OUTOF10: 0 - NO PAIN

## 2024-10-05 ASSESSMENT — COGNITIVE AND FUNCTIONAL STATUS - GENERAL
TOILETING: A LITTLE
MOVING FROM LYING ON BACK TO SITTING ON SIDE OF FLAT BED WITH BEDRAILS: A LITTLE
TOILETING: A LITTLE
DRESSING REGULAR UPPER BODY CLOTHING: A LITTLE
STANDING UP FROM CHAIR USING ARMS: A LOT
STANDING UP FROM CHAIR USING ARMS: A LITTLE
DRESSING REGULAR LOWER BODY CLOTHING: A LITTLE
CLIMB 3 TO 5 STEPS WITH RAILING: A LOT
DRESSING REGULAR UPPER BODY CLOTHING: A LITTLE
MOBILITY SCORE: 19
DAILY ACTIVITIY SCORE: 20
PERSONAL GROOMING: A LITTLE
HELP NEEDED FOR BATHING: A LITTLE
CLIMB 3 TO 5 STEPS WITH RAILING: A LOT
MOVING TO AND FROM BED TO CHAIR: A LOT
TURNING FROM BACK TO SIDE WHILE IN FLAT BAD: A LITTLE
MOBILITY SCORE: 14
DRESSING REGULAR LOWER BODY CLOTHING: A LITTLE
WALKING IN HOSPITAL ROOM: A LOT
HELP NEEDED FOR BATHING: A LITTLE
DAILY ACTIVITIY SCORE: 19
MOVING TO AND FROM BED TO CHAIR: A LITTLE
WALKING IN HOSPITAL ROOM: A LITTLE

## 2024-10-05 ASSESSMENT — PAIN DESCRIPTION - LOCATION
LOCATION: KNEE
LOCATION: KNEE

## 2024-10-05 NOTE — CONSULTS
"Reason For Consult  Left knee pain     History Of Present Illness  Dez Bonilla is a 69 y.o. male presenting with left knee pain.  He has a  history of cerebral palsy, developmental delay, HLD, osteoarthritis, testosterone deficiency, and T2DM who presented to the ED for left knee pain starting this morning.  He has chronic knee pain however this morning when he woke up the pain became more severe and he was unable to walk.  When trying to stand patient reports 9.5/10 pain. Per patient's sister, there is concern for bedbug infestation in his apartment.      Past Medical History  As above    Surgical History  As above     Social History  As above    Family History  No family history on file.     Allergies  Codeine and Shellfish derived    Review of Systems  Reivewed     Physical Exam  Pleasant and no acute distress. He has varus alignment of the left knee and neutral on the right. Left knee range of motion is 5-110°. The knee is stable to varus and valgus stress Lachman and posterior drawer. There is a mild effusion. There is generalized tenderness. Right knee range of motion is 0-120°. There is no effusion. The knee is stable to varus and valgus stress Lachman and posterior drawer. Both lower extremities are well perfused the skin is intact and muscle tone is adequate.      Last Recorded Vitals  Blood pressure 169/90, pulse 63, temperature 35.7 °C (96.3 °F), temperature source Temporal, resp. rate 16, height 1.651 m (5' 5\"), weight 113 kg (250 lb), SpO2 97%.    Relevant Results  XR knee left 1-2 views    Result Date: 10/4/2024  Interpreted By:  Wm Campbell, STUDY: XR KNEE LEFT 1-2 VIEWS; ;  10/4/2024 10:32 am   INDICATION: Signs/Symptoms:Atraumatic L knee pain/weakness.   COMPARISON: None.   ACCESSION NUMBER(S): [id number]   ORDERING CLINICIAN: ELVIS GOMEZ   FINDINGS: There is no fracture or subluxation. Marked degenerative changes  are present, mostly involving the medial and patellofemoral joint " compartment, with joint space narrowing, subchondral bone sclerosis, subchondral cysts and marginal osteophytes. The alignment is anatomic. The soft tissues are unremarkable.       Marked degenerative changes without acute fracture or subluxation.   Signed by: Wm Campbell 10/4/2024 10:50 AM Dictation workstation:   [id number]       Scheduled medications  acetaminophen, 650 mg, oral, q4h   Or  acetaminophen, 650 mg, oral, q4h   Or  acetaminophen, 650 mg, rectal, q4h  atorvastatin, 40 mg, oral, Nightly  cetirizine, 5 mg, oral, BID  enoxaparin, 40 mg, subcutaneous, q12h LASHONDA  insulin lispro, 0-10 Units, subcutaneous, TID  triamcinolone, 1 Application, Topical, BID      Continuous medications     PRN medications  PRN medications: diclofenac sodium, HYDROmorphone, oxyCODONE-acetaminophen, polyethylene glycol  Results for orders placed or performed during the hospital encounter of 10/04/24 (from the past 24 hour(s))   Urinalysis with Reflex Culture and Microscopic   Result Value Ref Range    Color, Urine Yellow Light-Yellow, Yellow, Dark-Yellow    Appearance, Urine Clear Clear    Specific Gravity, Urine 1.010 1.005 - 1.035    pH, Urine 6.5 5.0, 5.5, 6.0, 6.5, 7.0, 7.5, 8.0    Protein, Urine NEGATIVE NEGATIVE, 10 (TRACE), 20 (TRACE) mg/dL    Glucose, Urine Normal Normal mg/dL    Blood, Urine NEGATIVE NEGATIVE    Ketones, Urine NEGATIVE NEGATIVE mg/dL    Bilirubin, Urine NEGATIVE NEGATIVE    Urobilinogen, Urine 2 (1+) (A) Normal mg/dL    Nitrite, Urine NEGATIVE NEGATIVE    Leukocyte Esterase, Urine NEGATIVE NEGATIVE   Extra Urine Gray Tube   Result Value Ref Range    Extra Tube Hold for add-ons.    CBC and Auto Differential   Result Value Ref Range    WBC 5.9 4.4 - 11.3 x10*3/uL    nRBC 0.0 0.0 - 0.0 /100 WBCs    RBC 4.92 4.50 - 5.90 x10*6/uL    Hemoglobin 14.5 13.5 - 17.5 g/dL    Hematocrit 43.2 41.0 - 52.0 %    MCV 88 80 - 100 fL    MCH 29.5 26.0 - 34.0 pg    MCHC 33.6 32.0 - 36.0 g/dL    RDW 13.8 11.5 - 14.5 %     Platelets 186 150 - 450 x10*3/uL    Neutrophils % 75.6 40.0 - 80.0 %    Immature Granulocytes %, Automated 0.3 0.0 - 0.9 %    Lymphocytes % 14.9 13.0 - 44.0 %    Monocytes % 8.3 2.0 - 10.0 %    Eosinophils % 0.2 0.0 - 6.0 %    Basophils % 0.7 0.0 - 2.0 %    Neutrophils Absolute 4.47 1.20 - 7.70 x10*3/uL    Immature Granulocytes Absolute, Automated 0.02 0.00 - 0.70 x10*3/uL    Lymphocytes Absolute 0.88 (L) 1.20 - 4.80 x10*3/uL    Monocytes Absolute 0.49 0.10 - 1.00 x10*3/uL    Eosinophils Absolute 0.01 0.00 - 0.70 x10*3/uL    Basophils Absolute 0.04 0.00 - 0.10 x10*3/uL   Comprehensive metabolic panel   Result Value Ref Range    Glucose 108 (H) 74 - 99 mg/dL    Sodium 134 (L) 136 - 145 mmol/L    Potassium 3.6 3.5 - 5.3 mmol/L    Chloride 102 98 - 107 mmol/L    Bicarbonate 24 21 - 32 mmol/L    Anion Gap 12 10 - 20 mmol/L    Urea Nitrogen 14 6 - 23 mg/dL    Creatinine 1.00 0.50 - 1.30 mg/dL    eGFR 82 >60 mL/min/1.73m*2    Calcium 8.7 8.6 - 10.3 mg/dL    Albumin 3.9 3.4 - 5.0 g/dL    Alkaline Phosphatase 66 33 - 136 U/L    Total Protein 6.9 6.4 - 8.2 g/dL    AST 15 9 - 39 U/L    Bilirubin, Total 0.8 0.0 - 1.2 mg/dL    ALT 16 10 - 52 U/L   CBC   Result Value Ref Range    WBC 4.3 (L) 4.4 - 11.3 x10*3/uL    nRBC 0.0 0.0 - 0.0 /100 WBCs    RBC 4.80 4.50 - 5.90 x10*6/uL    Hemoglobin 14.0 13.5 - 17.5 g/dL    Hematocrit 44.2 41.0 - 52.0 %    MCV 92 80 - 100 fL    MCH 29.2 26.0 - 34.0 pg    MCHC 31.7 (L) 32.0 - 36.0 g/dL    RDW 13.9 11.5 - 14.5 %    Platelets 152 150 - 450 x10*3/uL   Basic Metabolic Panel   Result Value Ref Range    Glucose 94 74 - 99 mg/dL    Sodium 137 136 - 145 mmol/L    Potassium 3.8 3.5 - 5.3 mmol/L    Chloride 104 98 - 107 mmol/L    Bicarbonate 25 21 - 32 mmol/L    Anion Gap 12 10 - 20 mmol/L    Urea Nitrogen 13 6 - 23 mg/dL    Creatinine 0.84 0.50 - 1.30 mg/dL    eGFR >90 >60 mL/min/1.73m*2    Calcium 8.5 (L) 8.6 - 10.3 mg/dL   Magnesium   Result Value Ref Range    Magnesium 2.01 1.60 - 2.40 mg/dL    SST TOP   Result Value Ref Range    Extra Tube Hold for add-ons.         Assessment/Plan     69-year-old with left knee advanced degenerative joint disease.  Treatment options were discussed with the patient and his sister.  He should have physical therapy working on range of motion and strengthening and gait training.  He can have pain control as needed.  Option of cortisone injection was discussed.  The need to wait at least 3 months between a cortisone injection and surgery was also discussed.  At this point he is severely limited and debilitated and has particularly advanced degenerative disease and he may be a candidate for knee replacement if he is unable to function independently due to limitations from the knee.  Decision was made to defer cortisone injection and assess his progress with physical therapy.    Christiano Kennedy MD

## 2024-10-05 NOTE — PROGRESS NOTES
TCC note: Spoke with pt's sister regarding Discharge Planning. Pt is down as Self Pay however, Sister provided insurance cards. Pt has Medicare A & B and [employer]. Requested [facility] to please assist with updating insurance information in the computer. Pt's Sister states that she does not feel that pt is safe to return home by himself and is unable to care for himself. Sister stated that she was informed that pt may qualify for SNF, however, pt is Medicare OBS now. Waiting to hear back to see if pt qualifies for Inpatient. Message sent to MD and [facility] to please see if pt qualifies. Roma Barnett, MSN, RN, TCC.

## 2024-10-05 NOTE — CARE PLAN
The patient's goals for the shift include      The clinical goals for the shift include pt will remain safe and have pain controlled throughout shift    Over the shift, the patient did not make progress toward the following goals. Barriers to progression include acute illness. Recommendations to address these barriers include cmmunication.

## 2024-10-05 NOTE — PROGRESS NOTES
Dez Bonilla is a 69 y.o. male on day 0 of admission presenting with Osteoarthritis.      Assessment / Plan        Dez Bonilla is a 69-year-old male who presented for left knee pain rendering him unable to ambulate.  He was also found to have a rash suspected to be caused by bedbug infestation.    #Reduced mobility of L knee in the setting of osteoarthritis and cerebral palsy  Plan:  - Patient resting in bed on examination, does not have left knee pain at this time.  - Orthopedics was consulted, discussed treatment options with patient and his sister.  Recommended physical therapy, pain control as needed.  Cortisone injection was discussed, and the patient was informed that they would not need to wait at least 3 months between injection and surgery.  Patient may be candidate for TKA, and the decision was made to defer cortisone injection for now and to assess progress with PT.  - Continue pain management with Tylenol, lidocaine patch, voltaren gel, Percocet for moderate pain, and Dilaudid for severe pain.  - [facility] spoke with patient's sister regarding discharge planning, was informed that patient may qualify for SNF but patient is currently in observation.     #Red papular rash over BUE c/f bedbug infestation  #White macules over BUE  Plan:  -Patient's papular rash appears to be improving, has been receiving Kenalog and cetirizine.  He reports that he does not itch as much anymore.  When questioned further he says that the rash had been itching before coming to the hospital.  - Continue Kenalog and cetirizine.    Chronic conditions  #T2DM  #HLD  #HTN  Plan:  -Continue patient on atorvastatin.  Patient started on lisinopril 10mg, amlodipine 5mg, and hydralazine 5mg PRN for SBP>160/DBP>110.  - Continue SSI 2.    DVT ppx: Lovenox  IVF: -  Diet: Adult carb controlled  Code: DNR  Consults: Orthopedics       Noel Bunch MD   PGY-1, Internal Medicine  This is a preliminary note, please await attending attestation  "for final A/P    Subjective     Patient seen and examined. No acute overnight events.       Objective       Physical Exam:  General:  Pleasant and cooperative. No apparent distress.  HEENT:  Normocephalic, atraumatic  Chest:  Clear to auscultation bilaterally. No wheezes, rales, or rhonchi.  CV:  Regular rate and rhythm. No murmurs    Abdomen: Abdomen is soft, non-tender, non-distended. BS +   Extremities:  No lower extremity edema or cyanosis.   Neurological:  AAOx3. No focal deficits.  Skin:  Warm and dry.    Last Recorded Vitals  Blood pressure 171/76, pulse 75, temperature 36.1 °C (97 °F), resp. rate 18, height 1.651 m (5' 5\"), weight 113 kg (250 lb), SpO2 96%.  Intake/Output last 3 Shifts:  I/O last 3 completed shifts:  In: 1180 (10.4 mL/kg) [P.O.:1180]  Out: - (0 mL/kg)   Weight: 113.4 kg     Last CBC & BMP  Lab Results   Component Value Date    GLUCOSE 94 10/05/2024    CALCIUM 8.5 (L) 10/05/2024     10/05/2024    K 3.8 10/05/2024    CO2 25 10/05/2024     10/05/2024    BUN 13 10/05/2024    CREATININE 0.84 10/05/2024     Lab Results   Component Value Date    WBC 4.3 (L) 10/05/2024    HGB 14.0 10/05/2024    HCT 44.2 10/05/2024    MCV 92 10/05/2024     10/05/2024           "

## 2024-10-06 VITALS
TEMPERATURE: 97.5 F | DIASTOLIC BLOOD PRESSURE: 64 MMHG | OXYGEN SATURATION: 95 % | SYSTOLIC BLOOD PRESSURE: 127 MMHG | HEART RATE: 73 BPM | BODY MASS INDEX: 41.65 KG/M2 | HEIGHT: 65 IN | WEIGHT: 250 LBS | RESPIRATION RATE: 16 BRPM

## 2024-10-06 LAB
ANION GAP SERPL CALC-SCNC: 11 MMOL/L (ref 10–20)
BUN SERPL-MCNC: 12 MG/DL (ref 6–23)
CALCIUM SERPL-MCNC: 8.5 MG/DL (ref 8.6–10.3)
CHLORIDE SERPL-SCNC: 104 MMOL/L (ref 98–107)
CO2 SERPL-SCNC: 25 MMOL/L (ref 21–32)
CREAT SERPL-MCNC: 0.94 MG/DL (ref 0.5–1.3)
EGFRCR SERPLBLD CKD-EPI 2021: 89 ML/MIN/1.73M*2
ERYTHROCYTE [DISTWIDTH] IN BLOOD BY AUTOMATED COUNT: 13.8 % (ref 11.5–14.5)
GLUCOSE BLD MANUAL STRIP-MCNC: 113 MG/DL (ref 74–99)
GLUCOSE BLD MANUAL STRIP-MCNC: 122 MG/DL (ref 74–99)
GLUCOSE BLD MANUAL STRIP-MCNC: 126 MG/DL (ref 74–99)
GLUCOSE BLD MANUAL STRIP-MCNC: 90 MG/DL (ref 74–99)
GLUCOSE SERPL-MCNC: 108 MG/DL (ref 74–99)
HCT VFR BLD AUTO: 43 % (ref 41–52)
HGB BLD-MCNC: 14.1 G/DL (ref 13.5–17.5)
HOLD SPECIMEN: NORMAL
MCH RBC QN AUTO: 29.3 PG (ref 26–34)
MCHC RBC AUTO-ENTMCNC: 32.8 G/DL (ref 32–36)
MCV RBC AUTO: 89 FL (ref 80–100)
NRBC BLD-RTO: 0 /100 WBCS (ref 0–0)
PLATELET # BLD AUTO: 160 X10*3/UL (ref 150–450)
POTASSIUM SERPL-SCNC: 3.7 MMOL/L (ref 3.5–5.3)
RBC # BLD AUTO: 4.81 X10*6/UL (ref 4.5–5.9)
SODIUM SERPL-SCNC: 136 MMOL/L (ref 136–145)
WBC # BLD AUTO: 4.2 X10*3/UL (ref 4.4–11.3)

## 2024-10-06 PROCEDURE — 99232 SBSQ HOSP IP/OBS MODERATE 35: CPT

## 2024-10-06 PROCEDURE — 36415 COLL VENOUS BLD VENIPUNCTURE: CPT

## 2024-10-06 PROCEDURE — 82947 ASSAY GLUCOSE BLOOD QUANT: CPT

## 2024-10-06 PROCEDURE — 2500000001 HC RX 250 WO HCPCS SELF ADMINISTERED DRUGS (ALT 637 FOR MEDICARE OP)

## 2024-10-06 PROCEDURE — 2500000001 HC RX 250 WO HCPCS SELF ADMINISTERED DRUGS (ALT 637 FOR MEDICARE OP): Performed by: INTERNAL MEDICINE

## 2024-10-06 PROCEDURE — G0378 HOSPITAL OBSERVATION PER HR: HCPCS

## 2024-10-06 PROCEDURE — 85027 COMPLETE CBC AUTOMATED: CPT

## 2024-10-06 PROCEDURE — 80048 BASIC METABOLIC PNL TOTAL CA: CPT

## 2024-10-06 PROCEDURE — 2500000004 HC RX 250 GENERAL PHARMACY W/ HCPCS (ALT 636 FOR OP/ED)

## 2024-10-06 PROCEDURE — 2500000005 HC RX 250 GENERAL PHARMACY W/O HCPCS

## 2024-10-06 PROCEDURE — 96372 THER/PROPH/DIAG INJ SC/IM: CPT

## 2024-10-06 RX ADMIN — ATORVASTATIN CALCIUM 40 MG: 40 TABLET, FILM COATED ORAL at 20:26

## 2024-10-06 RX ADMIN — LISINOPRIL 10 MG: 10 TABLET ORAL at 09:34

## 2024-10-06 RX ADMIN — ACETAMINOPHEN 650 MG: 325 TABLET ORAL at 14:58

## 2024-10-06 RX ADMIN — ACETAMINOPHEN 650 MG: 325 TABLET ORAL at 18:20

## 2024-10-06 RX ADMIN — CETIRIZINE HYDROCHLORIDE 5 MG: 10 TABLET, FILM COATED ORAL at 09:34

## 2024-10-06 RX ADMIN — ACETAMINOPHEN 650 MG: 325 TABLET ORAL at 20:28

## 2024-10-06 RX ADMIN — ACETAMINOPHEN 650 MG: 325 TABLET ORAL at 09:43

## 2024-10-06 RX ADMIN — TRIAMCINOLONE ACETONIDE 1 APPLICATION: 1 CREAM TOPICAL at 20:28

## 2024-10-06 RX ADMIN — CETIRIZINE HYDROCHLORIDE 5 MG: 10 TABLET, FILM COATED ORAL at 20:26

## 2024-10-06 RX ADMIN — ENOXAPARIN SODIUM 40 MG: 40 INJECTION SUBCUTANEOUS at 09:34

## 2024-10-06 RX ADMIN — ENOXAPARIN SODIUM 40 MG: 40 INJECTION SUBCUTANEOUS at 20:30

## 2024-10-06 RX ADMIN — AMLODIPINE BESYLATE 5 MG: 5 TABLET ORAL at 09:34

## 2024-10-06 RX ADMIN — TRIAMCINOLONE ACETONIDE 1 APPLICATION: 1 CREAM TOPICAL at 09:34

## 2024-10-06 SDOH — SOCIAL STABILITY: SOCIAL INSECURITY
WITHIN THE LAST YEAR, HAVE TO BEEN RAPED OR FORCED TO HAVE ANY KIND OF SEXUAL ACTIVITY BY YOUR PARTNER OR EX-PARTNER?: PATIENT DECLINED

## 2024-10-06 SDOH — SOCIAL STABILITY: SOCIAL INSECURITY
WITHIN THE LAST YEAR, HAVE YOU BEEN KICKED, HIT, SLAPPED, OR OTHERWISE PHYSICALLY HURT BY YOUR PARTNER OR EX-PARTNER?: PATIENT DECLINED

## 2024-10-06 SDOH — ECONOMIC STABILITY: FOOD INSECURITY: WITHIN THE PAST 12 MONTHS, THE FOOD YOU BOUGHT JUST DIDN'T LAST AND YOU DIDN'T HAVE MONEY TO GET MORE.: PATIENT DECLINED

## 2024-10-06 SDOH — ECONOMIC STABILITY: FOOD INSECURITY
WITHIN THE PAST 12 MONTHS, YOU WORRIED THAT YOUR FOOD WOULD RUN OUT BEFORE YOU GOT THE MONEY TO BUY MORE.: PATIENT DECLINED

## 2024-10-06 SDOH — ECONOMIC STABILITY: FOOD INSECURITY: WITHIN THE PAST 12 MONTHS, YOU WORRIED THAT YOUR FOOD WOULD RUN OUT BEFORE YOU GOT MONEY TO BUY MORE.: PATIENT DECLINED

## 2024-10-06 SDOH — SOCIAL STABILITY: SOCIAL INSECURITY: WITHIN THE LAST YEAR, HAVE YOU BEEN AFRAID OF YOUR PARTNER OR EX-PARTNER?: PATIENT DECLINED

## 2024-10-06 SDOH — ECONOMIC STABILITY: INCOME INSECURITY
IN THE PAST 12 MONTHS HAS THE ELECTRIC, GAS, OIL, OR WATER COMPANY THREATENED TO SHUT OFF SERVICES IN YOUR HOME?: PATIENT DECLINED

## 2024-10-06 SDOH — SOCIAL STABILITY: SOCIAL INSECURITY
WITHIN THE LAST YEAR, HAVE YOU BEEN HUMILIATED OR EMOTIONALLY ABUSED IN OTHER WAYS BY YOUR PARTNER OR EX-PARTNER?: PATIENT DECLINED

## 2024-10-06 SDOH — SOCIAL STABILITY: SOCIAL INSECURITY
WITHIN THE LAST YEAR, HAVE YOU BEEN RAPED OR FORCED TO HAVE ANY KIND OF SEXUAL ACTIVITY BY YOUR PARTNER OR EX-PARTNER?: PATIENT DECLINED

## 2024-10-06 SDOH — ECONOMIC STABILITY: INCOME INSECURITY
IN THE PAST 12 MONTHS, HAS THE ELECTRIC, GAS, OIL, OR WATER COMPANY THREATENED TO SHUT OFF SERVICE IN YOUR HOME?: PATIENT DECLINED

## 2024-10-06 ASSESSMENT — COGNITIVE AND FUNCTIONAL STATUS - GENERAL
CLIMB 3 TO 5 STEPS WITH RAILING: A LOT
DAILY ACTIVITIY SCORE: 19
DRESSING REGULAR UPPER BODY CLOTHING: A LITTLE
PERSONAL GROOMING: A LITTLE
DRESSING REGULAR LOWER BODY CLOTHING: A LITTLE
WALKING IN HOSPITAL ROOM: A LITTLE
MOVING TO AND FROM BED TO CHAIR: A LITTLE
TOILETING: A LITTLE
STANDING UP FROM CHAIR USING ARMS: A LITTLE
HELP NEEDED FOR BATHING: A LITTLE
MOBILITY SCORE: 19

## 2024-10-06 ASSESSMENT — PAIN SCALES - GENERAL
PAINLEVEL_OUTOF10: 0 - NO PAIN
PAINLEVEL_OUTOF10: 0 - NO PAIN

## 2024-10-06 ASSESSMENT — PAIN - FUNCTIONAL ASSESSMENT
PAIN_FUNCTIONAL_ASSESSMENT: 0-10
PAIN_FUNCTIONAL_ASSESSMENT: 0-10

## 2024-10-06 NOTE — CARE PLAN
The patient's goals for the shift include      The clinical goals for the shift include pt will remain safe and free from falls    Over the shift, the patient did not make progress toward the following goals. Barriers to progression include weakness. Recommendations to address these barriers include frequent rounding.

## 2024-10-06 NOTE — PROGRESS NOTES
TCC Note: Message sent to MD and [facility] regarding pt possibly meeting criteria to be changed to Inpatient. Pt still under OBS status. Will contact Sister today to inform her that because pt is still Medicare OBS and not able to be changed to Inpatient, that Medicare will not pay for SNF and will discuss other options for his discharge plan. Sister did state to  on 10/4 that she has concerns over pt not being able to care for himself. Roma Barnett, MSN, RN, TCC.    ADDENDUM: Attempted to speak with pt's Sister over the phone, however, had to leave a voicemail informing her that the MD and UM had reviewed pt's case and he does not meet criteria for being flipped to Inpatient and then according to Medicare guidelines, they will not pay for a SNF stay however, she can pay for it out of pocket. Asked her to please give me a call pay to further discuss Discharge options. Awaiting a call back from her. Roma Barnett, [place], RN, TCC.    ADDENDUM: Received return phone call from sister and discussed Discharge Planning. Sister stated that she has an appointment tomorrow (10/7) to [facility] in [facility] for a place for her brother. She is hoping that pt can just be admitted there at the time of discharge. She stated that she will keep me updated after she tours. Will continue to follow. Roma Barnett, MSN, RN, TCC.

## 2024-10-06 NOTE — CARE PLAN
The patient's goals for the shift include  Rest    The clinical goals for the shift include Patient will be free from injury during the shift.    Over the shift, the patient did not make progress toward the following goals. Barriers to progression include Acute Illness. Recommendations to address these barriers include frequent rounding.

## 2024-10-06 NOTE — PROGRESS NOTES
Dez Bonilal is a 69 y.o. male on day 0 of admission presenting with Osteoarthritis.      Assessment / Plan        Dez Bonilla is a 69-year-old male who presented for left knee pain rendering him unable to ambulate.  He was also found to have a rash suspected to be caused by bedbug infestation.  He has been receiving Kenalog and Zyrtec.    #Reduced mobility of L knee in the setting of osteoarthritis and cerebral palsy   Plan:  -Patient is walking around more today, reports no knee pain.  - Continue pain management with Tylenol, lidocaine patch, Voltaren gel, Percocet for moderate pain, and Dilaudid for severe pain.  - TCC spoke with patient's sister and explained that after reviewing his case, he does not meet the criteria for being flipped to inpatient.  Patient's sister has an appointment on 10/7 due to her [facility] assisted living for the patient to be discharged to.  Sister will keep TCC updated.  - Recommend outpatient follow-up with orthopedics to determine course of action regarding TKA.    #Red papular rash over BUE c/f bedbug infestation - resolving  #White macules over BUE  Plan:  -On examination the patient's papular rash is improving.  Continue Kenalog and Zyrtec.    Chronic conditions  #T2DM  #HLD  #HTN  Plan:  -Continue patient on atorvastatin.  Patient started on lisinopril 10mg, amlodipine 5mg, and hydralazine 5mg PRN for SBP>160/DBP>110.  - Continue SSI 2.  - Patient had US-guided IV placed today.      DVT ppx: Lovenox  IVF: -  Diet: Adult carb controlled  Code: DNR  Consults: Orthopedics       Noel Bunch MD   PGY-1, Internal Medicine  This is a preliminary note, please await attending attestation for final A/P    Subjective     Patient seen and examined. No acute overnight events.       Objective       Physical Exam:  General:  Pleasant and cooperative. No apparent distress.  HEENT:  Normocephalic, atraumatic  Chest:  Clear to auscultation bilaterally. No wheezes, rales, or rhonchi.  CV:  " Regular rate and rhythm. No murmurs    Abdomen: Abdomen is soft, non-tender, non-distended. BS +   Extremities:  No lower extremity edema or cyanosis.   Neurological:  AAOx3. No focal deficits.  Skin: Red papules on BUE, improving    Last Recorded Vitals  Blood pressure 135/70, pulse 61, temperature 36.4 °C (97.5 °F), temperature source Temporal, resp. rate 16, height 1.651 m (5' 5\"), weight 113 kg (250 lb), SpO2 93%.  Intake/Output last 3 Shifts:  I/O last 3 completed shifts:  In: 1180 (10.4 mL/kg) [P.O.:1180]  Out: - (0 mL/kg)   Weight: 113.4 kg     Last CBC & BMP  Lab Results   Component Value Date    GLUCOSE 108 (H) 10/06/2024    CALCIUM 8.5 (L) 10/06/2024     10/06/2024    K 3.7 10/06/2024    CO2 25 10/06/2024     10/06/2024    BUN 12 10/06/2024    CREATININE 0.94 10/06/2024     Lab Results   Component Value Date    WBC 4.2 (L) 10/06/2024    HGB 14.1 10/06/2024    HCT 43.0 10/06/2024    MCV 89 10/06/2024     10/06/2024           "

## 2024-10-07 LAB
ANION GAP SERPL CALC-SCNC: 13 MMOL/L (ref 10–20)
BUN SERPL-MCNC: 12 MG/DL (ref 6–23)
CALCIUM SERPL-MCNC: 8.4 MG/DL (ref 8.6–10.3)
CHLORIDE SERPL-SCNC: 103 MMOL/L (ref 98–107)
CO2 SERPL-SCNC: 25 MMOL/L (ref 21–32)
CREAT SERPL-MCNC: 0.88 MG/DL (ref 0.5–1.3)
EGFRCR SERPLBLD CKD-EPI 2021: >90 ML/MIN/1.73M*2
ERYTHROCYTE [DISTWIDTH] IN BLOOD BY AUTOMATED COUNT: 13.9 % (ref 11.5–14.5)
GLUCOSE BLD MANUAL STRIP-MCNC: 108 MG/DL (ref 74–99)
GLUCOSE BLD MANUAL STRIP-MCNC: 109 MG/DL (ref 74–99)
GLUCOSE BLD MANUAL STRIP-MCNC: 134 MG/DL (ref 74–99)
GLUCOSE BLD MANUAL STRIP-MCNC: 89 MG/DL (ref 74–99)
GLUCOSE SERPL-MCNC: 99 MG/DL (ref 74–99)
HCT VFR BLD AUTO: 42.2 % (ref 41–52)
HGB BLD-MCNC: 13.6 G/DL (ref 13.5–17.5)
MCH RBC QN AUTO: 29.2 PG (ref 26–34)
MCHC RBC AUTO-ENTMCNC: 32.2 G/DL (ref 32–36)
MCV RBC AUTO: 91 FL (ref 80–100)
NRBC BLD-RTO: 0 /100 WBCS (ref 0–0)
PLATELET # BLD AUTO: 167 X10*3/UL (ref 150–450)
POTASSIUM SERPL-SCNC: 3.8 MMOL/L (ref 3.5–5.3)
RBC # BLD AUTO: 4.66 X10*6/UL (ref 4.5–5.9)
SODIUM SERPL-SCNC: 137 MMOL/L (ref 136–145)
WBC # BLD AUTO: 4.6 X10*3/UL (ref 4.4–11.3)

## 2024-10-07 PROCEDURE — 85027 COMPLETE CBC AUTOMATED: CPT

## 2024-10-07 PROCEDURE — 82947 ASSAY GLUCOSE BLOOD QUANT: CPT

## 2024-10-07 PROCEDURE — 36415 COLL VENOUS BLD VENIPUNCTURE: CPT

## 2024-10-07 PROCEDURE — 99232 SBSQ HOSP IP/OBS MODERATE 35: CPT

## 2024-10-07 PROCEDURE — 97116 GAIT TRAINING THERAPY: CPT | Mod: GP,CQ

## 2024-10-07 PROCEDURE — 2500000001 HC RX 250 WO HCPCS SELF ADMINISTERED DRUGS (ALT 637 FOR MEDICARE OP): Performed by: INTERNAL MEDICINE

## 2024-10-07 PROCEDURE — 2500000005 HC RX 250 GENERAL PHARMACY W/O HCPCS

## 2024-10-07 PROCEDURE — 2500000004 HC RX 250 GENERAL PHARMACY W/ HCPCS (ALT 636 FOR OP/ED)

## 2024-10-07 PROCEDURE — G0378 HOSPITAL OBSERVATION PER HR: HCPCS

## 2024-10-07 PROCEDURE — 80048 BASIC METABOLIC PNL TOTAL CA: CPT

## 2024-10-07 PROCEDURE — 96372 THER/PROPH/DIAG INJ SC/IM: CPT

## 2024-10-07 PROCEDURE — 2500000001 HC RX 250 WO HCPCS SELF ADMINISTERED DRUGS (ALT 637 FOR MEDICARE OP)

## 2024-10-07 PROCEDURE — 97535 SELF CARE MNGMENT TRAINING: CPT | Mod: GP,CQ

## 2024-10-07 RX ORDER — ACETAMINOPHEN 650 MG/1
650 SUPPOSITORY RECTAL EVERY 4 HOURS PRN
Status: DISCONTINUED | OUTPATIENT
Start: 2024-10-07 | End: 2024-10-15 | Stop reason: HOSPADM

## 2024-10-07 RX ORDER — ACETAMINOPHEN 160 MG/5ML
650 SOLUTION ORAL EVERY 4 HOURS PRN
Status: DISCONTINUED | OUTPATIENT
Start: 2024-10-07 | End: 2024-10-15 | Stop reason: HOSPADM

## 2024-10-07 RX ORDER — ACETAMINOPHEN 325 MG/1
650 TABLET ORAL EVERY 4 HOURS PRN
Status: DISCONTINUED | OUTPATIENT
Start: 2024-10-07 | End: 2024-10-15 | Stop reason: HOSPADM

## 2024-10-07 RX ADMIN — ACETAMINOPHEN 650 MG: 325 TABLET ORAL at 10:17

## 2024-10-07 RX ADMIN — LISINOPRIL 10 MG: 10 TABLET ORAL at 10:17

## 2024-10-07 RX ADMIN — ATORVASTATIN CALCIUM 40 MG: 40 TABLET, FILM COATED ORAL at 20:53

## 2024-10-07 RX ADMIN — ENOXAPARIN SODIUM 40 MG: 40 INJECTION SUBCUTANEOUS at 20:54

## 2024-10-07 RX ADMIN — CETIRIZINE HYDROCHLORIDE 5 MG: 10 TABLET, FILM COATED ORAL at 10:17

## 2024-10-07 RX ADMIN — CETIRIZINE HYDROCHLORIDE 5 MG: 10 TABLET, FILM COATED ORAL at 20:54

## 2024-10-07 RX ADMIN — AMLODIPINE BESYLATE 5 MG: 5 TABLET ORAL at 10:17

## 2024-10-07 RX ADMIN — ENOXAPARIN SODIUM 40 MG: 40 INJECTION SUBCUTANEOUS at 10:17

## 2024-10-07 RX ADMIN — LIDOCAINE 4% 1 PATCH: 40 PATCH TOPICAL at 10:17

## 2024-10-07 ASSESSMENT — COGNITIVE AND FUNCTIONAL STATUS - GENERAL
STANDING UP FROM CHAIR USING ARMS: A LITTLE
WALKING IN HOSPITAL ROOM: A LITTLE
DRESSING REGULAR LOWER BODY CLOTHING: A LITTLE
MOVING TO AND FROM BED TO CHAIR: A LITTLE
CLIMB 3 TO 5 STEPS WITH RAILING: A LITTLE
WALKING IN HOSPITAL ROOM: A LITTLE
TOILETING: A LITTLE
MOVING FROM LYING ON BACK TO SITTING ON SIDE OF FLAT BED WITH BEDRAILS: A LITTLE
TURNING FROM BACK TO SIDE WHILE IN FLAT BAD: A LITTLE
HELP NEEDED FOR BATHING: A LITTLE
WALKING IN HOSPITAL ROOM: A LITTLE
CLIMB 3 TO 5 STEPS WITH RAILING: TOTAL
DRESSING REGULAR UPPER BODY CLOTHING: A LITTLE
DAILY ACTIVITIY SCORE: 22
MOVING FROM LYING ON BACK TO SITTING ON SIDE OF FLAT BED WITH BEDRAILS: A LITTLE
MOBILITY SCORE: 21
MOVING TO AND FROM BED TO CHAIR: A LITTLE
TURNING FROM BACK TO SIDE WHILE IN FLAT BAD: A LITTLE
DRESSING REGULAR LOWER BODY CLOTHING: A LITTLE
MOBILITY SCORE: 18
MOBILITY SCORE: 16
HELP NEEDED FOR BATHING: A LITTLE
CLIMB 3 TO 5 STEPS WITH RAILING: A LITTLE
PERSONAL GROOMING: A LITTLE
STANDING UP FROM CHAIR USING ARMS: A LITTLE
STANDING UP FROM CHAIR USING ARMS: A LITTLE
EATING MEALS: A LITTLE
DAILY ACTIVITIY SCORE: 18

## 2024-10-07 ASSESSMENT — PAIN SCALES - GENERAL
PAINLEVEL_OUTOF10: 0 - NO PAIN
PAINLEVEL_OUTOF10: 3
PAINLEVEL_OUTOF10: 0 - NO PAIN

## 2024-10-07 ASSESSMENT — PAIN - FUNCTIONAL ASSESSMENT
PAIN_FUNCTIONAL_ASSESSMENT: 0-10

## 2024-10-07 NOTE — PROGRESS NOTES
Physical Therapy    Physical Therapy Treatment    Patient Name: Dez Bonilla  MRN: [id number]  Department: [facility]  Room: [address] [place] [address]  Today's Date: 10/7/2024  Time Calculation  Start Time: 1058  Stop Time: 1123  Time Calculation (min): 25 min       Assessment/Plan   PT Assessment  End of Session Patient Position: Up in chair, Alarm on     PT Plan  Treatment/Interventions: Bed mobility, Transfer training, Gait training, Balance training, Therapeutic exercise, Therapeutic activity  PT Plan: Ongoing PT  PT Frequency: 3 times per week  PT Discharge Recommendations: Moderate intensity level of continued care  PT Recommended Transfer Status: Assist x2  PT - OK to Discharge: Yes (when cleared by medical team)      General Visit Information:   PT  Visit  PT Received On: 10/07/24  General  Prior to Session Communication: Bedside nurse  Patient Position Received: Bed, 2 rail up, Alarm off, not on at start of session  General Comment:  (pt pleasant and agreeable to participate in therapy session.)    Subjective   Precautions:  Precautions  Medical Precautions: Fall precautions          Objective   Pain:  Pain Assessment  Pain Assessment: 0-10  0-10 (Numeric) Pain Score: 0 - No pain  Pain Interventions:  (pt with pain patch on L knee and reports recently having medication)     Treatments:  Therapeutic Exercise  Therapeutic Exercise Performed:  (seated BLE AP, LAQ, and marching x 10 ea.)    Bed Mobility  Bed Mobility:  (sup > sit with SBA)    Ambulation/Gait Training  Ambulation/Gait Training Performed:  (pt ambulates >/=50 ft x 2 with FWW and min A x 1.  pt also able to complete x2 trials static stance of approx. 2 min. each with FWW and SBA/CGA)    Transfers  Transfer:  (sit <> stand x3 trials with FWW and min A x 1)    Outcome Measures:  [facility] Basic Mobility  Turning from your back to your side while in a flat bed without using bedrails: A little  Moving from lying on your back to sitting on the side of a flat bed without using  bedrails: A little  Moving to and from bed to chair (including a wheelchair): A little  Standing up from a chair using your arms (e.g. wheelchair or bedside chair): A little  To walk in hospital room: A little  Climbing 3-5 steps with railing: Total  Basic Mobility - Total Score: 16    Education Documentation  Mobility Training, taught by Angela Fajardo PTA at 10/7/2024  2:26 PM.  Learner: Patient  Readiness: Acceptance  Method: Explanation  Response: Verbalizes Understanding, Needs Reinforcement    Education Comments  No comments found.        EDUCATION:       Encounter Problems       Encounter Problems (Active)       PT Problem       STG - Pt will transition supine <> sitting with cga  (Progressing)       Start:  10/04/24    Expected End:  10/18/24            STG - Pt will transfer STS with cga  (Progressing)       Start:  10/04/24    Expected End:  10/18/24            STG - Pt will amb >=40' using ww with cga  (Progressing)       Start:  10/04/24    Expected End:  10/18/24            STG - Pt will perform a B LE ther ex program of 2-3 sets of 10  (Progressing)       Start:  10/04/24    Expected End:  10/18/24

## 2024-10-07 NOTE — PROGRESS NOTES
Dez Bonilla is a 69 y.o. male on day 0 of admission presenting with Osteoarthritis.      Assessment / Plan        Dez Bonilla is a 69-year-old male who presented for left knee pain rendering him unable to ambulate.  He was also found to have a rash suspected to be caused by bedbug infestation.  He has been receiving Kenalog and Zyrtec. He is being seen by PT and ambulating without pain.    #Reduced mobility of L knee in the setting of osteoarthritis and cerebral palsy - resolving  Plan:  -Patient continues to ambulate with help and physical therapy, reports no knee pain.  - Continue pain management with as needed Tylenol, lidocaine patch, Voltaren gel, Percocet for moderate pain, and dilaudid for severe pain.  - PCC spoke with patient's history today and after turning [facility] sister today, [facility] is most likely able to accept patient as a respite patient on Wednesday.  Facility will update sister as soon as possible and [facility] is following.    #Red papular rash over BUE c/f bedbug infestation - resolving  #White macules over BUE  Plan:  -On examination the patient's papular rash is improving.  Continue Kenalog and Zyrtec.    Chronic conditions  #T2DM  #HLD  #HTN  Plan:  -Continue patient on atorvastatin.  Patient started on lisinopril 10mg, amlodipine 5mg, and hydralazine 5mg PRN for SBP>160/DBP>110.  - Continue SSI 2.        DVT ppx: Lovenox  IVF: -  Diet: Adult carb controlled  Code: DNR  Consults: Orthopedics       Noel Bunch MD   PGY-1, Internal Medicine  This is a preliminary note, please await attending attestation for final A/P    Subjective     Patient seen and examined. No acute overnight events.       Objective       Physical Exam:  General:  Pleasant and cooperative. No apparent distress.  HEENT:  Normocephalic, atraumatic  Chest:  Clear to auscultation bilaterally. No wheezes, rales, or rhonchi.  CV:  Regular rate and rhythm. No murmurs    Abdomen: Abdomen is soft, non-tender,  "non-distended. BS +   Extremities:  No lower extremity edema or cyanosis.   Neurological:  AAOx3. No focal deficits.  Skin:  Warm and dry.    Last Recorded Vitals  Blood pressure 125/64, pulse 79, temperature 36.4 °C (97.5 °F), temperature source Temporal, resp. rate 18, height 1.651 m (5' 5\"), weight 113 kg (250 lb), SpO2 95%.  Intake/Output last 3 Shifts:  I/O last 3 completed shifts:  In: 829 (7.3 mL/kg) [P.O.:829]  Out: - (0 mL/kg)   Weight: 113.4 kg     Last CBC & BMP  Lab Results   Component Value Date    GLUCOSE 99 10/07/2024    CALCIUM 8.4 (L) 10/07/2024     10/07/2024    K 3.8 10/07/2024    CO2 25 10/07/2024     10/07/2024    BUN 12 10/07/2024    CREATININE 0.88 10/07/2024     Lab Results   Component Value Date    WBC 4.6 10/07/2024    HGB 13.6 10/07/2024    HCT 42.2 10/07/2024    MCV 91 10/07/2024     10/07/2024           "

## 2024-10-07 NOTE — PROGRESS NOTES
TCC Note: Spoke with Sister and she stated that she was impressed with facility and that they will most likely be able to accept pt at a Respite pt on Wednesday. Facility will update Sister asap. Sister stated to me that she would update me as soon as she heard from them. MD made aware. Roma Barnett, MSN, RN, TCC.

## 2024-10-08 LAB
ANION GAP SERPL CALC-SCNC: 13 MMOL/L (ref 10–20)
BUN SERPL-MCNC: 12 MG/DL (ref 6–23)
CALCIUM SERPL-MCNC: 9 MG/DL (ref 8.6–10.3)
CHLORIDE SERPL-SCNC: 103 MMOL/L (ref 98–107)
CO2 SERPL-SCNC: 25 MMOL/L (ref 21–32)
CREAT SERPL-MCNC: 0.92 MG/DL (ref 0.5–1.3)
EGFRCR SERPLBLD CKD-EPI 2021: >90 ML/MIN/1.73M*2
ERYTHROCYTE [DISTWIDTH] IN BLOOD BY AUTOMATED COUNT: 13.7 % (ref 11.5–14.5)
GLUCOSE BLD MANUAL STRIP-MCNC: 101 MG/DL (ref 74–99)
GLUCOSE BLD MANUAL STRIP-MCNC: 166 MG/DL (ref 74–99)
GLUCOSE BLD MANUAL STRIP-MCNC: 96 MG/DL (ref 74–99)
GLUCOSE BLD MANUAL STRIP-MCNC: 98 MG/DL (ref 74–99)
GLUCOSE BLD MANUAL STRIP-MCNC: 99 MG/DL (ref 74–99)
GLUCOSE SERPL-MCNC: 100 MG/DL (ref 74–99)
HCT VFR BLD AUTO: 45.6 % (ref 41–52)
HGB BLD-MCNC: 14.3 G/DL (ref 13.5–17.5)
MCH RBC QN AUTO: 29.1 PG (ref 26–34)
MCHC RBC AUTO-ENTMCNC: 31.4 G/DL (ref 32–36)
MCV RBC AUTO: 93 FL (ref 80–100)
NRBC BLD-RTO: 0 /100 WBCS (ref 0–0)
PLATELET # BLD AUTO: 174 X10*3/UL (ref 150–450)
POTASSIUM SERPL-SCNC: 3.8 MMOL/L (ref 3.5–5.3)
RBC # BLD AUTO: 4.91 X10*6/UL (ref 4.5–5.9)
SODIUM SERPL-SCNC: 137 MMOL/L (ref 136–145)
WBC # BLD AUTO: 5.7 X10*3/UL (ref 4.4–11.3)

## 2024-10-08 PROCEDURE — 85027 COMPLETE CBC AUTOMATED: CPT

## 2024-10-08 PROCEDURE — 80048 BASIC METABOLIC PNL TOTAL CA: CPT

## 2024-10-08 PROCEDURE — 82947 ASSAY GLUCOSE BLOOD QUANT: CPT

## 2024-10-08 PROCEDURE — G0378 HOSPITAL OBSERVATION PER HR: HCPCS

## 2024-10-08 PROCEDURE — 2500000004 HC RX 250 GENERAL PHARMACY W/ HCPCS (ALT 636 FOR OP/ED)

## 2024-10-08 PROCEDURE — 99231 SBSQ HOSP IP/OBS SF/LOW 25: CPT

## 2024-10-08 PROCEDURE — 2500000005 HC RX 250 GENERAL PHARMACY W/O HCPCS

## 2024-10-08 PROCEDURE — 36415 COLL VENOUS BLD VENIPUNCTURE: CPT

## 2024-10-08 PROCEDURE — 96372 THER/PROPH/DIAG INJ SC/IM: CPT

## 2024-10-08 PROCEDURE — 2500000001 HC RX 250 WO HCPCS SELF ADMINISTERED DRUGS (ALT 637 FOR MEDICARE OP)

## 2024-10-08 PROCEDURE — 2500000001 HC RX 250 WO HCPCS SELF ADMINISTERED DRUGS (ALT 637 FOR MEDICARE OP): Performed by: INTERNAL MEDICINE

## 2024-10-08 RX ADMIN — LIDOCAINE 4% 1 PATCH: 40 PATCH TOPICAL at 09:06

## 2024-10-08 RX ADMIN — AMLODIPINE BESYLATE 5 MG: 5 TABLET ORAL at 09:06

## 2024-10-08 RX ADMIN — ENOXAPARIN SODIUM 40 MG: 40 INJECTION SUBCUTANEOUS at 20:56

## 2024-10-08 RX ADMIN — ATORVASTATIN CALCIUM 40 MG: 40 TABLET, FILM COATED ORAL at 20:57

## 2024-10-08 RX ADMIN — LISINOPRIL 10 MG: 10 TABLET ORAL at 09:06

## 2024-10-08 RX ADMIN — CETIRIZINE HYDROCHLORIDE 5 MG: 10 TABLET, FILM COATED ORAL at 09:06

## 2024-10-08 RX ADMIN — CETIRIZINE HYDROCHLORIDE 5 MG: 10 TABLET, FILM COATED ORAL at 20:57

## 2024-10-08 RX ADMIN — ENOXAPARIN SODIUM 40 MG: 40 INJECTION SUBCUTANEOUS at 09:07

## 2024-10-08 ASSESSMENT — PAIN SCALES - GENERAL
PAINLEVEL_OUTOF10: 0 - NO PAIN
PAINLEVEL_OUTOF10: 0 - NO PAIN
PAINLEVEL_OUTOF10: 4

## 2024-10-08 ASSESSMENT — COGNITIVE AND FUNCTIONAL STATUS - GENERAL
CLIMB 3 TO 5 STEPS WITH RAILING: A LITTLE
DRESSING REGULAR LOWER BODY CLOTHING: A LITTLE
HELP NEEDED FOR BATHING: A LITTLE
MOBILITY SCORE: 21
TURNING FROM BACK TO SIDE WHILE IN FLAT BAD: A LITTLE
WALKING IN HOSPITAL ROOM: A LITTLE
EATING MEALS: A LITTLE
DRESSING REGULAR LOWER BODY CLOTHING: A LITTLE
STANDING UP FROM CHAIR USING ARMS: A LITTLE
WALKING IN HOSPITAL ROOM: A LITTLE
MOVING TO AND FROM BED TO CHAIR: A LITTLE
HELP NEEDED FOR BATHING: A LITTLE
TOILETING: A LITTLE
MOVING FROM LYING ON BACK TO SITTING ON SIDE OF FLAT BED WITH BEDRAILS: A LITTLE
STANDING UP FROM CHAIR USING ARMS: A LITTLE
DAILY ACTIVITIY SCORE: 18
MOBILITY SCORE: 18
DAILY ACTIVITIY SCORE: 22
CLIMB 3 TO 5 STEPS WITH RAILING: A LITTLE
DRESSING REGULAR UPPER BODY CLOTHING: A LITTLE
PERSONAL GROOMING: A LITTLE

## 2024-10-08 ASSESSMENT — PAIN - FUNCTIONAL ASSESSMENT
PAIN_FUNCTIONAL_ASSESSMENT: 0-10

## 2024-10-08 NOTE — PROGRESS NOTES
Dez Bonilla is a 69 y.o. male on day 0 of admission presenting with Osteoarthritis.      Assessment / Plan        Dez Bonilla is a 69-year-old male who presented for left knee pain rendering him unable to ambulate.  He was also found to have a rash suspected to be caused by bedbug infestation.  He has been receiving Kenalog and Zyrtec. He is being seen by PT and ambulating without pain.    #Reduced mobility of L knee in the setting of osteoarthritis and cerebral palsy - resolving   Plan:  -Patient continues to ambulate with help from physical therapy.  He does not report any knee pain at this time.  - Continue pain management as needed with Tylenol, lidocaine patch, Voltaren gel, Percocet, and Dilaudid.  - Tentative discharge tomorrow to [facility] living as respite stay.  Continue to follow-up with [facility].    #Red papular rash over BUE c/f bedbug infestation - resolving  #White macules over BUE  Plan:  -On examination the papular rash is improving.  Continue Kenalog and Zyrtec.    Chronic conditions  #T2DM  #HLD  #HTN  Plan:  -Continue patient on atorvastatin.  Patient started on lisinopril 10mg, amlodipine 5mg, and hydralazine 5mg PRN for SBP>160/DBP>110.  - Continue SSI 2.        DVT ppx: Lovenox  IVF: -  Diet: Adult carb controlled  Code: DNR  Consults: Orthopedics       Noel Bunch MD   PGY-1, Internal Medicine  This is a preliminary note, please await attending attestation for final A/P    Subjective     Patient seen and examined. No acute overnight events.       Objective       Physical Exam:  General:  Pleasant and cooperative. No apparent distress.  HEENT:  Normocephalic, atraumatic  Chest:  Clear to auscultation bilaterally. No wheezes, rales, or rhonchi.  CV:  Regular rate and rhythm. No murmurs    Abdomen: Abdomen is soft, non-tender, non-distended. BS +   Extremities:  No lower extremity edema or cyanosis.   Neurological:  AAOx3. No focal deficits.  Skin:  Warm and dry.    Last Recorded  "Vitals  Blood pressure 112/59, pulse 87, temperature 36.5 °C (97.7 °F), temperature source Temporal, resp. rate 20, height 1.651 m (5' 5\"), weight 113 kg (250 lb), SpO2 94%.  Intake/Output last 3 Shifts:  No intake/output data recorded.    Last CBC & BMP  Lab Results   Component Value Date    GLUCOSE 100 (H) 10/08/2024    CALCIUM 9.0 10/08/2024     10/08/2024    K 3.8 10/08/2024    CO2 25 10/08/2024     10/08/2024    BUN 12 10/08/2024    CREATININE 0.92 10/08/2024     Lab Results   Component Value Date    WBC 5.7 10/08/2024    HGB 14.3 10/08/2024    HCT 45.6 10/08/2024    MCV 93 10/08/2024     10/08/2024           "

## 2024-10-08 NOTE — CARE PLAN
The patient's goals for the shift include  decreased knee pain and decreased weakness.    The clinical goals for the shift include patient will have decreased knee pain during shift.    Over the shift, the patient did not make progress toward the following goals. Barriers to progression include home alone, weakness, pain in left knee. Recommendations to address these barriers include medication and consults.

## 2024-10-09 LAB
GLUCOSE BLD MANUAL STRIP-MCNC: 101 MG/DL (ref 74–99)
GLUCOSE BLD MANUAL STRIP-MCNC: 118 MG/DL (ref 74–99)
GLUCOSE BLD MANUAL STRIP-MCNC: 156 MG/DL (ref 74–99)
GLUCOSE BLD MANUAL STRIP-MCNC: 94 MG/DL (ref 74–99)

## 2024-10-09 PROCEDURE — 2500000001 HC RX 250 WO HCPCS SELF ADMINISTERED DRUGS (ALT 637 FOR MEDICARE OP): Performed by: INTERNAL MEDICINE

## 2024-10-09 PROCEDURE — 99231 SBSQ HOSP IP/OBS SF/LOW 25: CPT

## 2024-10-09 PROCEDURE — 99231 SBSQ HOSP IP/OBS SF/LOW 25: CPT | Performed by: STUDENT IN AN ORGANIZED HEALTH CARE EDUCATION/TRAINING PROGRAM

## 2024-10-09 PROCEDURE — 96372 THER/PROPH/DIAG INJ SC/IM: CPT

## 2024-10-09 PROCEDURE — 2500000004 HC RX 250 GENERAL PHARMACY W/ HCPCS (ALT 636 FOR OP/ED)

## 2024-10-09 PROCEDURE — G0378 HOSPITAL OBSERVATION PER HR: HCPCS

## 2024-10-09 PROCEDURE — 2500000001 HC RX 250 WO HCPCS SELF ADMINISTERED DRUGS (ALT 637 FOR MEDICARE OP)

## 2024-10-09 PROCEDURE — 82947 ASSAY GLUCOSE BLOOD QUANT: CPT

## 2024-10-09 RX ADMIN — CETIRIZINE HYDROCHLORIDE 5 MG: 10 TABLET, FILM COATED ORAL at 09:45

## 2024-10-09 RX ADMIN — AMLODIPINE BESYLATE 5 MG: 5 TABLET ORAL at 09:45

## 2024-10-09 RX ADMIN — CETIRIZINE HYDROCHLORIDE 5 MG: 10 TABLET, FILM COATED ORAL at 20:22

## 2024-10-09 RX ADMIN — ENOXAPARIN SODIUM 40 MG: 40 INJECTION SUBCUTANEOUS at 09:47

## 2024-10-09 RX ADMIN — LISINOPRIL 10 MG: 10 TABLET ORAL at 09:45

## 2024-10-09 RX ADMIN — TRIAMCINOLONE ACETONIDE 1 APPLICATION: 1 CREAM TOPICAL at 20:22

## 2024-10-09 RX ADMIN — ENOXAPARIN SODIUM 40 MG: 40 INJECTION SUBCUTANEOUS at 20:22

## 2024-10-09 RX ADMIN — ATORVASTATIN CALCIUM 40 MG: 40 TABLET, FILM COATED ORAL at 20:22

## 2024-10-09 ASSESSMENT — COGNITIVE AND FUNCTIONAL STATUS - GENERAL
STANDING UP FROM CHAIR USING ARMS: A LITTLE
DRESSING REGULAR LOWER BODY CLOTHING: A LITTLE
DAILY ACTIVITIY SCORE: 22
WALKING IN HOSPITAL ROOM: A LITTLE
MOBILITY SCORE: 21
CLIMB 3 TO 5 STEPS WITH RAILING: A LITTLE
HELP NEEDED FOR BATHING: A LITTLE

## 2024-10-09 ASSESSMENT — PAIN SCALES - GENERAL
PAINLEVEL_OUTOF10: 0 - NO PAIN
PAINLEVEL_OUTOF10: 0 - NO PAIN

## 2024-10-09 NOTE — CARE PLAN
The patient's goals for the shift include pt. Safety.    The clinical goals for the shift include Pt will remain safe and free from falls and injury

## 2024-10-09 NOTE — PROGRESS NOTES
Dez Bonilla is a 69 y.o. male on day 0 of admission presenting with Osteoarthritis.      Assessment / Plan        Dez Bonilla is a 69-year-old male who presented for left knee pain rendering him unable to ambulate.  He was also found to have a rash suspected to be caused by bedbug infestation.  He has been receiving Kenalog and Zyrtec. He is being seen by PT and ambulating without pain. He is pending acceptance to facility as a respite stay.    #Reduced mobility of L knee in the setting of osteoarthritis and cerebral palsy - resolving   Plan:  -Patient continues to ambulate and does not report any pain at this time.  - Continue pain management as needed.  -  TCC called the patient's sister, who informed her that the patient's application to [facility] is under review and that the  would be reaching out to her today or tomorrow.  Sister was informed that the patient is medically ready for discharge.  - Continue to follow-up with TCC, plan for discharge tomorrow pending approval from [place].    #Red papular rash over BUE c/f bedbug infestation - resolving  #White macules over BUE  Plan:  -Patient's papular rash is improving but has not yet resolved entirely.  Continue Kenalog and Zyrtec.    Chronic conditions  #T2DM  #HLD  #HTN  Plan:  -Continue patient on atorvastatin.  Patient started on lisinopril 10mg, amlodipine 5mg, and hydralazine 5mg PRN for SBP>160/DBP>110.  - Continue SSI 2.      DVT ppx: Lovenox  IVF: -  Diet: Adult carb controlled  Code: DNR  Consults: Orthopedics       Noel Bunch MD   PGY-1, Internal Medicine  This is a preliminary note, please await attending attestation for final A/P    Subjective     Patient seen and examined. No acute overnight events.       Objective       Physical Exam:  General:  Pleasant and cooperative. No apparent distress.  HEENT:  Normocephalic, atraumatic  Chest:  Clear to auscultation bilaterally. No wheezes, rales, or rhonchi.  CV:  Regular  "rate and rhythm. No murmurs    Abdomen: Abdomen is soft, non-tender, non-distended. BS +   Extremities:  No lower extremity edema or cyanosis.   Neurological:  AAOx3. No focal deficits.  Skin:  Warm and dry.    Last Recorded Vitals  Blood pressure 108/63, pulse 77, temperature 36.4 °C (97.5 °F), resp. rate 18, height 1.651 m (5' 5\"), weight 113 kg (250 lb), SpO2 97%.  Intake/Output last 3 Shifts:  No intake/output data recorded.    Last CBC & BMP  Lab Results   Component Value Date    GLUCOSE 100 (H) 10/08/2024    CALCIUM 9.0 10/08/2024     10/08/2024    K 3.8 10/08/2024    CO2 25 10/08/2024     10/08/2024    BUN 12 10/08/2024    CREATININE 0.92 10/08/2024     Lab Results   Component Value Date    WBC 5.7 10/08/2024    HGB 14.3 10/08/2024    HCT 45.6 10/08/2024    MCV 93 10/08/2024     10/08/2024           "

## 2024-10-09 NOTE — PROGRESS NOTES
TCC Note: Received message from MD inquiring about update on [facility] for this pt. Called Sister and she informed me that they are supposed to update her today. Asked for contact information for the person that she spoke with at [facility]. Person is Pura and phone is [phone] X [phone]. Called Pura for update and she informed me that the pt's application is under review and the  will be reaching out to the sister either today or tomorrow. Informed Pura that pt is medically ready. They requested that I fax over H & P. Faxed to [phone], ATTN: Pura. Waiting for decision/confirmation from . MD informed. Roma Barnett, MSN, RN, TCC.

## 2024-10-10 LAB
ANION GAP SERPL CALC-SCNC: 10 MMOL/L (ref 10–20)
BUN SERPL-MCNC: 15 MG/DL (ref 6–23)
CALCIUM SERPL-MCNC: 8.7 MG/DL (ref 8.6–10.3)
CHLORIDE SERPL-SCNC: 103 MMOL/L (ref 98–107)
CO2 SERPL-SCNC: 26 MMOL/L (ref 21–32)
CREAT SERPL-MCNC: 0.82 MG/DL (ref 0.5–1.3)
EGFRCR SERPLBLD CKD-EPI 2021: >90 ML/MIN/1.73M*2
ERYTHROCYTE [DISTWIDTH] IN BLOOD BY AUTOMATED COUNT: 13.7 % (ref 11.5–14.5)
GLUCOSE BLD MANUAL STRIP-MCNC: 109 MG/DL (ref 74–99)
GLUCOSE BLD MANUAL STRIP-MCNC: 111 MG/DL (ref 74–99)
GLUCOSE BLD MANUAL STRIP-MCNC: 167 MG/DL (ref 74–99)
GLUCOSE BLD MANUAL STRIP-MCNC: 93 MG/DL (ref 74–99)
GLUCOSE SERPL-MCNC: 110 MG/DL (ref 74–99)
HCT VFR BLD AUTO: 42.5 % (ref 41–52)
HGB BLD-MCNC: 13.7 G/DL (ref 13.5–17.5)
MCH RBC QN AUTO: 29.3 PG (ref 26–34)
MCHC RBC AUTO-ENTMCNC: 32.2 G/DL (ref 32–36)
MCV RBC AUTO: 91 FL (ref 80–100)
NRBC BLD-RTO: 0 /100 WBCS (ref 0–0)
PLATELET # BLD AUTO: 179 X10*3/UL (ref 150–450)
POTASSIUM SERPL-SCNC: 3.8 MMOL/L (ref 3.5–5.3)
RBC # BLD AUTO: 4.67 X10*6/UL (ref 4.5–5.9)
SODIUM SERPL-SCNC: 135 MMOL/L (ref 136–145)
WBC # BLD AUTO: 6.1 X10*3/UL (ref 4.4–11.3)

## 2024-10-10 PROCEDURE — 97116 GAIT TRAINING THERAPY: CPT | Mod: GP,CQ

## 2024-10-10 PROCEDURE — 2500000001 HC RX 250 WO HCPCS SELF ADMINISTERED DRUGS (ALT 637 FOR MEDICARE OP)

## 2024-10-10 PROCEDURE — 2500000001 HC RX 250 WO HCPCS SELF ADMINISTERED DRUGS (ALT 637 FOR MEDICARE OP): Performed by: INTERNAL MEDICINE

## 2024-10-10 PROCEDURE — 36415 COLL VENOUS BLD VENIPUNCTURE: CPT

## 2024-10-10 PROCEDURE — 99231 SBSQ HOSP IP/OBS SF/LOW 25: CPT | Performed by: STUDENT IN AN ORGANIZED HEALTH CARE EDUCATION/TRAINING PROGRAM

## 2024-10-10 PROCEDURE — 82947 ASSAY GLUCOSE BLOOD QUANT: CPT

## 2024-10-10 PROCEDURE — 85027 COMPLETE CBC AUTOMATED: CPT

## 2024-10-10 PROCEDURE — G0378 HOSPITAL OBSERVATION PER HR: HCPCS

## 2024-10-10 PROCEDURE — 99231 SBSQ HOSP IP/OBS SF/LOW 25: CPT

## 2024-10-10 PROCEDURE — 96372 THER/PROPH/DIAG INJ SC/IM: CPT

## 2024-10-10 PROCEDURE — 80048 BASIC METABOLIC PNL TOTAL CA: CPT

## 2024-10-10 PROCEDURE — 2500000004 HC RX 250 GENERAL PHARMACY W/ HCPCS (ALT 636 FOR OP/ED)

## 2024-10-10 RX ADMIN — CETIRIZINE HYDROCHLORIDE 5 MG: 10 TABLET, FILM COATED ORAL at 20:17

## 2024-10-10 RX ADMIN — ENOXAPARIN SODIUM 40 MG: 40 INJECTION SUBCUTANEOUS at 09:27

## 2024-10-10 RX ADMIN — AMLODIPINE BESYLATE 5 MG: 5 TABLET ORAL at 09:27

## 2024-10-10 RX ADMIN — CETIRIZINE HYDROCHLORIDE 5 MG: 10 TABLET, FILM COATED ORAL at 09:28

## 2024-10-10 RX ADMIN — ATORVASTATIN CALCIUM 40 MG: 40 TABLET, FILM COATED ORAL at 20:17

## 2024-10-10 RX ADMIN — LISINOPRIL 10 MG: 10 TABLET ORAL at 09:28

## 2024-10-10 RX ADMIN — ENOXAPARIN SODIUM 40 MG: 40 INJECTION SUBCUTANEOUS at 20:17

## 2024-10-10 ASSESSMENT — COGNITIVE AND FUNCTIONAL STATUS - GENERAL
CLIMB 3 TO 5 STEPS WITH RAILING: A LOT
WALKING IN HOSPITAL ROOM: A LITTLE
WALKING IN HOSPITAL ROOM: A LITTLE
HELP NEEDED FOR BATHING: A LITTLE
DRESSING REGULAR LOWER BODY CLOTHING: A LITTLE
STANDING UP FROM CHAIR USING ARMS: A LITTLE
MOBILITY SCORE: 19
DAILY ACTIVITIY SCORE: 22
MOVING TO AND FROM BED TO CHAIR: A LITTLE
STANDING UP FROM CHAIR USING ARMS: A LITTLE
CLIMB 3 TO 5 STEPS WITH RAILING: A LITTLE
MOBILITY SCORE: 21

## 2024-10-10 ASSESSMENT — PAIN SCALES - GENERAL
PAINLEVEL_OUTOF10: 0 - NO PAIN
PAINLEVEL_OUTOF10: 0 - NO PAIN

## 2024-10-10 ASSESSMENT — PAIN - FUNCTIONAL ASSESSMENT
PAIN_FUNCTIONAL_ASSESSMENT: 0-10
PAIN_FUNCTIONAL_ASSESSMENT: 0-10

## 2024-10-10 NOTE — CARE PLAN
The patient's goals for the shift include      The clinical goals for the shift include pt will remain safe and free from falls and injury    Over the shift, the patient did not make progress toward the following goals. Barriers to progression include weakness. Recommendations to address these barriers include PT/OT, assistance as needed.

## 2024-10-10 NOTE — PROGRESS NOTES
Physical Therapy    Physical Therapy Treatment    Patient Name: Dez Bonilla  MRN: [id number]  Department: [facility]  Room: [address] [place] [address]  Today's Date: 10/10/2024  Time Calculation  Start Time: 1038  Stop Time: 1101  Time Calculation (min): 23 min       Assessment/Plan   PT Assessment  End of Session Communication: Bedside nurse  End of Session Patient Position: Up in chair, Alarm off, not on at start of session (no posey alarm needed per communication with RN)     PT Plan  Treatment/Interventions: Bed mobility, Transfer training, Gait training, Balance training, Therapeutic exercise, Therapeutic activity  PT Plan: Ongoing PT  PT Frequency: 3 times per week  PT Discharge Recommendations: Moderate intensity level of continued care  PT Recommended Transfer Status: Assist x2  PT - OK to Discharge: Yes (when cleared by medical team)      General Visit Information:   PT  Visit  PT Received On: 10/10/24  General  Prior to Session Communication: Bedside nurse  Patient Position Received: Bed, 2 rail up, Alarm off, not on at start of session  General Comment:  (pt pleasant and agreeable to participate in therapy session.)    Subjective   Precautions:  Precautions  Medical Precautions: Fall precautions        Objective   Pain:  Pain Assessment  Pain Assessment: 0-10  0-10 (Numeric) Pain Score: 0 - No pain     Treatments:  Bed Mobility  Bed Mobility:  (sup > sit modified independent)    Ambulation/Gait Training  Ambulation/Gait Training Performed:  (pt ambulates >/=150 ft with FWW and CGA.  somewhat unsteady/tremulous at times though no episodes LOB.  pt also able to perform one small step on/off weight scale with min A x 1.)    Transfers  Transfer:  (sit <> stand x4 trials with SBA)    Outcome Measures:  [facility] Basic Mobility  Turning from your back to your side while in a flat bed without using bedrails: None  Moving from lying on your back to sitting on the side of a flat bed without using bedrails: None  Moving to and from bed to  chair (including a wheelchair): A little  Standing up from a chair using your arms (e.g. wheelchair or bedside chair): A little  To walk in hospital room: A little  Climbing 3-5 steps with railing: A lot  Basic Mobility - Total Score: 19    Education Documentation  Mobility Training, taught by Angela Fajardo PTA at 10/10/2024  1:46 PM.  Learner: Patient  Readiness: Acceptance  Method: Explanation  Response: Verbalizes Understanding    Education Comments  No comments found.        EDUCATION:       Encounter Problems       Encounter Problems (Active)       PT Problem       STG - Pt will transition supine <> sitting with cga  (Progressing)       Start:  10/04/24    Expected End:  10/18/24            STG - Pt will transfer STS with cga  (Met)       Start:  10/04/24    Expected End:  10/18/24    Resolved:  10/10/24         STG - Pt will amb >=40' using ww with cga  (Met)       Start:  10/04/24    Expected End:  10/18/24    Resolved:  10/10/24         STG - Pt will perform a B LE ther ex program of 2-3 sets of 10  (Progressing)       Start:  10/04/24    Expected End:  10/18/24

## 2024-10-10 NOTE — PROGRESS NOTES
Therapy Communication Note    Patient Name: Dez Bonilla  MRN: [id number]  Department: [facility]  Room: [address] [place] [address]  Today's Date: 10/10/2024     Discipline: Occupational Therapy    Missed Visit Reason: Missed Visit Reason: Patient refused (Patient resting comfortably in recliner chair on attempt, declining participation at this time. Will re-attempt as able.)    Missed Time: Attempt

## 2024-10-10 NOTE — CARE PLAN
The patient's goals for the shift include  sleep, comfort and safety    The clinical goals for the shift include Patient will remain safe and free from injury for entire shift.

## 2024-10-10 NOTE — PROGRESS NOTES
TCC Note: Received message from MD inquiring if we have heard back from [facility] yet. Spoke with Sister and they have not called her yet. I called the , Madeleine and had to leave a message for her to return my call. Also, left a message for Karla, Director of Nursing X [phone] to return my call and Pura in [employer] to please return my call. Messaged MD back with that information. Still waiting for an update from [facility] and call the Sister to update her. Roma Barnett, MSN, RN, TCC.

## 2024-10-11 LAB
ANION GAP SERPL CALC-SCNC: 13 MMOL/L (ref 10–20)
BUN SERPL-MCNC: 16 MG/DL (ref 6–23)
CALCIUM SERPL-MCNC: 8.8 MG/DL (ref 8.6–10.3)
CHLORIDE SERPL-SCNC: 104 MMOL/L (ref 98–107)
CO2 SERPL-SCNC: 25 MMOL/L (ref 21–32)
CREAT SERPL-MCNC: 0.94 MG/DL (ref 0.5–1.3)
EGFRCR SERPLBLD CKD-EPI 2021: 89 ML/MIN/1.73M*2
ERYTHROCYTE [DISTWIDTH] IN BLOOD BY AUTOMATED COUNT: 13.8 % (ref 11.5–14.5)
GLUCOSE BLD MANUAL STRIP-MCNC: 100 MG/DL (ref 74–99)
GLUCOSE BLD MANUAL STRIP-MCNC: 106 MG/DL (ref 74–99)
GLUCOSE BLD MANUAL STRIP-MCNC: 112 MG/DL (ref 74–99)
GLUCOSE BLD MANUAL STRIP-MCNC: 132 MG/DL (ref 74–99)
GLUCOSE SERPL-MCNC: 106 MG/DL (ref 74–99)
HCT VFR BLD AUTO: 43.4 % (ref 41–52)
HGB BLD-MCNC: 13.9 G/DL (ref 13.5–17.5)
MCH RBC QN AUTO: 29.1 PG (ref 26–34)
MCHC RBC AUTO-ENTMCNC: 32 G/DL (ref 32–36)
MCV RBC AUTO: 91 FL (ref 80–100)
NRBC BLD-RTO: 0 /100 WBCS (ref 0–0)
PLATELET # BLD AUTO: 172 X10*3/UL (ref 150–450)
POTASSIUM SERPL-SCNC: 4.6 MMOL/L (ref 3.5–5.3)
RBC # BLD AUTO: 4.78 X10*6/UL (ref 4.5–5.9)
SODIUM SERPL-SCNC: 137 MMOL/L (ref 136–145)
WBC # BLD AUTO: 6.3 X10*3/UL (ref 4.4–11.3)

## 2024-10-11 PROCEDURE — 85027 COMPLETE CBC AUTOMATED: CPT

## 2024-10-11 PROCEDURE — 99231 SBSQ HOSP IP/OBS SF/LOW 25: CPT | Performed by: STUDENT IN AN ORGANIZED HEALTH CARE EDUCATION/TRAINING PROGRAM

## 2024-10-11 PROCEDURE — G0378 HOSPITAL OBSERVATION PER HR: HCPCS

## 2024-10-11 PROCEDURE — 96372 THER/PROPH/DIAG INJ SC/IM: CPT

## 2024-10-11 PROCEDURE — 36415 COLL VENOUS BLD VENIPUNCTURE: CPT

## 2024-10-11 PROCEDURE — 82947 ASSAY GLUCOSE BLOOD QUANT: CPT

## 2024-10-11 PROCEDURE — 2500000004 HC RX 250 GENERAL PHARMACY W/ HCPCS (ALT 636 FOR OP/ED)

## 2024-10-11 PROCEDURE — 99231 SBSQ HOSP IP/OBS SF/LOW 25: CPT

## 2024-10-11 PROCEDURE — 80048 BASIC METABOLIC PNL TOTAL CA: CPT

## 2024-10-11 PROCEDURE — 2500000001 HC RX 250 WO HCPCS SELF ADMINISTERED DRUGS (ALT 637 FOR MEDICARE OP): Performed by: INTERNAL MEDICINE

## 2024-10-11 PROCEDURE — 2500000001 HC RX 250 WO HCPCS SELF ADMINISTERED DRUGS (ALT 637 FOR MEDICARE OP)

## 2024-10-11 RX ADMIN — CETIRIZINE HYDROCHLORIDE 5 MG: 10 TABLET, FILM COATED ORAL at 09:26

## 2024-10-11 RX ADMIN — ACETAMINOPHEN 650 MG: 160 SOLUTION ORAL at 20:50

## 2024-10-11 RX ADMIN — AMLODIPINE BESYLATE 5 MG: 5 TABLET ORAL at 09:26

## 2024-10-11 RX ADMIN — LISINOPRIL 10 MG: 10 TABLET ORAL at 09:26

## 2024-10-11 RX ADMIN — CETIRIZINE HYDROCHLORIDE 5 MG: 10 TABLET, FILM COATED ORAL at 20:44

## 2024-10-11 RX ADMIN — ACETAMINOPHEN 650 MG: 325 TABLET, FILM COATED ORAL at 09:30

## 2024-10-11 RX ADMIN — ATORVASTATIN CALCIUM 40 MG: 40 TABLET, FILM COATED ORAL at 20:44

## 2024-10-11 RX ADMIN — ENOXAPARIN SODIUM 40 MG: 40 INJECTION SUBCUTANEOUS at 20:44

## 2024-10-11 RX ADMIN — ENOXAPARIN SODIUM 40 MG: 40 INJECTION SUBCUTANEOUS at 09:26

## 2024-10-11 ASSESSMENT — PAIN DESCRIPTION - DESCRIPTORS: DESCRIPTORS: ACHING

## 2024-10-11 ASSESSMENT — COGNITIVE AND FUNCTIONAL STATUS - GENERAL
DRESSING REGULAR LOWER BODY CLOTHING: A LITTLE
STANDING UP FROM CHAIR USING ARMS: A LITTLE
WALKING IN HOSPITAL ROOM: A LITTLE
CLIMB 3 TO 5 STEPS WITH RAILING: A LITTLE
HELP NEEDED FOR BATHING: A LITTLE
DAILY ACTIVITIY SCORE: 22
MOBILITY SCORE: 21

## 2024-10-11 ASSESSMENT — PAIN - FUNCTIONAL ASSESSMENT
PAIN_FUNCTIONAL_ASSESSMENT: 0-10

## 2024-10-11 ASSESSMENT — PAIN SCALES - GENERAL
PAINLEVEL_OUTOF10: 0 - NO PAIN
PAINLEVEL_OUTOF10: 3
PAINLEVEL_OUTOF10: 0 - NO PAIN
PAINLEVEL_OUTOF10: 0 - NO PAIN
PAINLEVEL_OUTOF10: 1

## 2024-10-11 NOTE — CARE PLAN
The patient's goals for the shift include      The clinical goals for the shift include pt will remain safe and free from falls and injury    Over the shift, the patient made progress toward the following goals.

## 2024-10-11 NOTE — PROGRESS NOTES
Dez Bonilla is a 69 y.o. male on day 0 of admission presenting with Osteoarthritis.      Assessment / Plan        Dez Bonilla is a 69-year-old male who presented for left knee pain rendering him unable to ambulate.  He was also found to have a rash suspected to be caused by bedbug infestation.  He has been receiving Kenalog and Zyrtec. He is being seen by PT and ambulating without pain. He is pending acceptance to facility as a respite stay.    #Reduced mobility of L knee in the setting of osteoarthritis and cerebral palsy - resolving   Plan:  -Patient is ambulating without any pain at this time.  - Per TCC,  of [place] had concerns over potential bedbug infestation.  Representatives from [employer] were reassured that no bedbugs were noticed at the patient's belongings have been replaced.  Primary team attempted to contact , but there was no response.  - Liaison from [place] reached out and faxed form in order for patient to be reviewed to go to [facility].  Primary team and [facility] spoke with sister, was agreeable to this.  Form was filled and sent back to liaison, continue to follow with updates from [facility].     #Red papular rash over BUE c/f bedbug infestation - resolving  #White macules over BUE  Plan:  - Continue Kenalog and Zyrtec.    Chronic conditions  #T2DM  #HLD  #HTN  Plan:  -Continue patient on atorvastatin.  Patient started on lisinopril 10mg, amlodipine 5mg, and hydralazine 5mg PRN for SBP>160/DBP>110.  - Continue SSI 2.        DVT ppx: Lovenox  IVF: -  Diet: Adult carb controlled  Code: DNR  Consults: Orthopedics       Noel Bunch MD   PGY-1, Internal Medicine  This is a preliminary note, please await attending attestation for final A/P    Subjective     Patient seen and examined. No acute overnight events.       Objective       Physical Exam:  General:  Pleasant and cooperative. No apparent distress.  HEENT:  Normocephalic, atraumatic  Chest:  Clear  "to auscultation bilaterally. No wheezes, rales, or rhonchi.  CV:  Regular rate and rhythm. No murmurs    Abdomen: Abdomen is soft, non-tender, non-distended. BS +   Extremities:  No lower extremity edema or cyanosis.   Neurological:  AAOx3. No focal deficits.  Skin:  Warm and dry.    Last Recorded Vitals  Blood pressure 107/62, pulse 80, temperature 36.1 °C (97 °F), resp. rate 18, height 1.651 m (5' 5\"), weight 113 kg (250 lb), SpO2 95%.  Intake/Output last 3 Shifts:  I/O last 3 completed shifts:  In: 1777 (15.7 mL/kg) [P.O.:1777]  Out: - (0 mL/kg)   Weight: 113.4 kg     Last CBC & BMP  Lab Results   Component Value Date    GLUCOSE 106 (H) 10/11/2024    CALCIUM 8.8 10/11/2024     10/11/2024    K 4.6 10/11/2024    CO2 25 10/11/2024     10/11/2024    BUN 16 10/11/2024    CREATININE 0.94 10/11/2024     Lab Results   Component Value Date    WBC 6.3 10/11/2024    HGB 13.9 10/11/2024    HCT 43.4 10/11/2024    MCV 91 10/11/2024     10/11/2024           "

## 2024-10-11 NOTE — PROGRESS NOTES
[facility] Note: Liaison from [place] was here around 8:00 AM to do bedside eval on pt. Spoke with pt's sister twice today. [employer] faxed over Form for MD to fill out in order for pt to be reviewed to go to [facility]. Informed sister that I faxed the form over. Will continue to follow for placement. Roma Barnett, MSN, RN, TCC.

## 2024-10-11 NOTE — CARE PLAN
The patient's goals for the shift include      The clinical goals for the shift include pt will not fall for the remainder of the shift    Over the shift, patient aware to ask for assistance prior to getting out of bed

## 2024-10-12 LAB
GLUCOSE BLD MANUAL STRIP-MCNC: 104 MG/DL (ref 74–99)
GLUCOSE BLD MANUAL STRIP-MCNC: 115 MG/DL (ref 74–99)
GLUCOSE BLD MANUAL STRIP-MCNC: 119 MG/DL (ref 74–99)
GLUCOSE BLD MANUAL STRIP-MCNC: 94 MG/DL (ref 74–99)

## 2024-10-12 PROCEDURE — 2500000001 HC RX 250 WO HCPCS SELF ADMINISTERED DRUGS (ALT 637 FOR MEDICARE OP)

## 2024-10-12 PROCEDURE — 2500000001 HC RX 250 WO HCPCS SELF ADMINISTERED DRUGS (ALT 637 FOR MEDICARE OP): Performed by: INTERNAL MEDICINE

## 2024-10-12 PROCEDURE — 99231 SBSQ HOSP IP/OBS SF/LOW 25: CPT | Performed by: STUDENT IN AN ORGANIZED HEALTH CARE EDUCATION/TRAINING PROGRAM

## 2024-10-12 PROCEDURE — G0378 HOSPITAL OBSERVATION PER HR: HCPCS

## 2024-10-12 PROCEDURE — 2500000004 HC RX 250 GENERAL PHARMACY W/ HCPCS (ALT 636 FOR OP/ED)

## 2024-10-12 PROCEDURE — 96372 THER/PROPH/DIAG INJ SC/IM: CPT

## 2024-10-12 PROCEDURE — 2500000005 HC RX 250 GENERAL PHARMACY W/O HCPCS

## 2024-10-12 PROCEDURE — 99231 SBSQ HOSP IP/OBS SF/LOW 25: CPT

## 2024-10-12 PROCEDURE — 82947 ASSAY GLUCOSE BLOOD QUANT: CPT

## 2024-10-12 RX ADMIN — ATORVASTATIN CALCIUM 40 MG: 40 TABLET, FILM COATED ORAL at 20:21

## 2024-10-12 RX ADMIN — LIDOCAINE 4% 1 PATCH: 40 PATCH TOPICAL at 09:25

## 2024-10-12 RX ADMIN — LISINOPRIL 10 MG: 10 TABLET ORAL at 09:24

## 2024-10-12 RX ADMIN — ENOXAPARIN SODIUM 40 MG: 40 INJECTION SUBCUTANEOUS at 09:24

## 2024-10-12 RX ADMIN — CETIRIZINE HYDROCHLORIDE 5 MG: 10 TABLET, FILM COATED ORAL at 20:21

## 2024-10-12 RX ADMIN — CETIRIZINE HYDROCHLORIDE 5 MG: 10 TABLET, FILM COATED ORAL at 09:24

## 2024-10-12 RX ADMIN — ENOXAPARIN SODIUM 40 MG: 40 INJECTION SUBCUTANEOUS at 20:21

## 2024-10-12 RX ADMIN — AMLODIPINE BESYLATE 5 MG: 5 TABLET ORAL at 09:25

## 2024-10-12 ASSESSMENT — COGNITIVE AND FUNCTIONAL STATUS - GENERAL
DAILY ACTIVITIY SCORE: 22
WALKING IN HOSPITAL ROOM: A LITTLE
HELP NEEDED FOR BATHING: A LITTLE
DRESSING REGULAR LOWER BODY CLOTHING: A LITTLE
STANDING UP FROM CHAIR USING ARMS: A LITTLE
CLIMB 3 TO 5 STEPS WITH RAILING: A LITTLE
MOBILITY SCORE: 21

## 2024-10-12 ASSESSMENT — PAIN SCALES - GENERAL
PAINLEVEL_OUTOF10: 0 - NO PAIN
PAINLEVEL_OUTOF10: 0 - NO PAIN

## 2024-10-12 NOTE — CARE PLAN
The patient's goals for the shift include      The clinical goals for the shift include pt will not fall for the remainder of the shift    Over the shift, the patient did not make progress toward the following goals. Barriers to progression include pt has BLE weakness. Recommendations to address these barriers include bed alarm on.

## 2024-10-12 NOTE — PROGRESS NOTES
Spoke with patients sister regarding discharge plan. Received update from patients sister that [facility] is able to accept patient on Tuesday. Dr. Rivera updated.

## 2024-10-12 NOTE — PROGRESS NOTES
Dez Bonilla is a 69 y.o. male on day 0 of admission presenting with Osteoarthritis.      Assessment / Plan        Dez Bonilla is a 69-year-old male who presented for left knee pain rendering him unable to ambulate.  He was also found to have a rash suspected to be caused by bedbug infestation.  He has been receiving Kenalog and Zyrtec. He is being seen by PT and ambulating without pain. He is pending acceptance to facility as a respite stay.    #Reduced mobility of L knee in the setting of osteoarthritis and cerebral palsy - resolving   Plan:  - Continue to await [facility] substance  -Patient feels well.  No acute events.  Comfortable and alert.  - Per [facility],  of [place] had concerns over potential bedbug infestation.  Representatives from [employer] were reassured that no bedbugs were noticed at the patient's belongings have been replaced.  Primary team attempted to contact , but there was no response.  - Liaison from [place] reached out and faxed form in order for patient to be reviewed to go to [facility].  Primary team and [facility] spoke with sister, was agreeable to this.  Form was filled and sent back to liaison, continue to follow with updates from [facility].     #Red papular rash over BUE c/f bedbug infestation - resolving  #White macules over BUE  Plan:  - Continue Kenalog and Zyrtec.    Chronic conditions  #T2DM  #HLD  #HTN  Plan:  -Continue patient on atorvastatin.  Patient started on lisinopril 10mg, amlodipine 5mg, and hydralazine 5mg PRN for SBP>160/DBP>110.  - Continue SSI 2.        DVT ppx: Lovenox  IVF: -  Diet: Adult carb controlled  Code: DNR  Consults: Orthopedics       Nikko Rivera MD  PGY-2, Internal Medicine  This is a preliminary note, please await attending attestation for final A/P    Subjective     Patient seen and examined bedside.  No acute events overnight.  Vital signs stable.  Patient not complaining of anything.      Objective       Physical  "Exam:  General:  Pleasant and cooperative. No apparent distress.  HEENT:  Normocephalic, atraumatic  Chest:  Clear to auscultation bilaterally. No wheezes, rales, or rhonchi.  CV:  Regular rate and rhythm. No murmurs    Abdomen: Abdomen is soft, non-tender, non-distended. BS +   Extremities:  No lower extremity edema or cyanosis.   Neurological:  AAOx3. No focal deficits.  Skin:  Warm and dry.    Last Recorded Vitals  Blood pressure 138/68, pulse 67, temperature 36.4 °C (97.5 °F), temperature source Temporal, resp. rate 18, height 1.651 m (5' 5\"), weight 113 kg (250 lb), SpO2 96%.  Intake/Output last 3 Shifts:  I/O last 3 completed shifts:  In: 474 (4.2 mL/kg) [P.O.:474]  Out: - (0 mL/kg)   Weight: 113.4 kg     Last CBC & BMP  Lab Results   Component Value Date    GLUCOSE 106 (H) 10/11/2024    CALCIUM 8.8 10/11/2024     10/11/2024    K 4.6 10/11/2024    CO2 25 10/11/2024     10/11/2024    BUN 16 10/11/2024    CREATININE 0.94 10/11/2024     Lab Results   Component Value Date    WBC 6.3 10/11/2024    HGB 13.9 10/11/2024    HCT 43.4 10/11/2024    MCV 91 10/11/2024     10/11/2024           "

## 2024-10-13 VITALS
SYSTOLIC BLOOD PRESSURE: 120 MMHG | WEIGHT: 250 LBS | HEIGHT: 65 IN | DIASTOLIC BLOOD PRESSURE: 56 MMHG | RESPIRATION RATE: 16 BRPM | TEMPERATURE: 97 F | HEART RATE: 78 BPM | BODY MASS INDEX: 41.65 KG/M2 | OXYGEN SATURATION: 93 %

## 2024-10-13 LAB
GLUCOSE BLD MANUAL STRIP-MCNC: 102 MG/DL (ref 74–99)
GLUCOSE BLD MANUAL STRIP-MCNC: 105 MG/DL (ref 74–99)
GLUCOSE BLD MANUAL STRIP-MCNC: 158 MG/DL (ref 74–99)

## 2024-10-13 PROCEDURE — 2500000005 HC RX 250 GENERAL PHARMACY W/O HCPCS

## 2024-10-13 PROCEDURE — 96372 THER/PROPH/DIAG INJ SC/IM: CPT

## 2024-10-13 PROCEDURE — 82947 ASSAY GLUCOSE BLOOD QUANT: CPT

## 2024-10-13 PROCEDURE — 99231 SBSQ HOSP IP/OBS SF/LOW 25: CPT | Performed by: STUDENT IN AN ORGANIZED HEALTH CARE EDUCATION/TRAINING PROGRAM

## 2024-10-13 PROCEDURE — G0378 HOSPITAL OBSERVATION PER HR: HCPCS

## 2024-10-13 PROCEDURE — 2500000004 HC RX 250 GENERAL PHARMACY W/ HCPCS (ALT 636 FOR OP/ED)

## 2024-10-13 PROCEDURE — 2500000001 HC RX 250 WO HCPCS SELF ADMINISTERED DRUGS (ALT 637 FOR MEDICARE OP)

## 2024-10-13 PROCEDURE — 99231 SBSQ HOSP IP/OBS SF/LOW 25: CPT

## 2024-10-13 PROCEDURE — 2500000001 HC RX 250 WO HCPCS SELF ADMINISTERED DRUGS (ALT 637 FOR MEDICARE OP): Performed by: INTERNAL MEDICINE

## 2024-10-13 RX ADMIN — ATORVASTATIN CALCIUM 40 MG: 40 TABLET, FILM COATED ORAL at 19:33

## 2024-10-13 RX ADMIN — ENOXAPARIN SODIUM 40 MG: 40 INJECTION SUBCUTANEOUS at 09:32

## 2024-10-13 RX ADMIN — CETIRIZINE HYDROCHLORIDE 5 MG: 10 TABLET, FILM COATED ORAL at 09:35

## 2024-10-13 RX ADMIN — LISINOPRIL 10 MG: 10 TABLET ORAL at 09:32

## 2024-10-13 RX ADMIN — ENOXAPARIN SODIUM 40 MG: 40 INJECTION SUBCUTANEOUS at 19:33

## 2024-10-13 RX ADMIN — LIDOCAINE 4% 1 PATCH: 40 PATCH TOPICAL at 09:33

## 2024-10-13 RX ADMIN — AMLODIPINE BESYLATE 5 MG: 5 TABLET ORAL at 09:32

## 2024-10-13 RX ADMIN — TRIAMCINOLONE ACETONIDE 1 APPLICATION: 1 CREAM TOPICAL at 09:33

## 2024-10-13 RX ADMIN — ACETAMINOPHEN 650 MG: 325 TABLET, FILM COATED ORAL at 19:39

## 2024-10-13 RX ADMIN — ACETAMINOPHEN 650 MG: 325 TABLET, FILM COATED ORAL at 03:03

## 2024-10-13 RX ADMIN — CETIRIZINE HYDROCHLORIDE 5 MG: 10 TABLET, FILM COATED ORAL at 19:33

## 2024-10-13 ASSESSMENT — COGNITIVE AND FUNCTIONAL STATUS - GENERAL
DRESSING REGULAR LOWER BODY CLOTHING: A LITTLE
PERSONAL GROOMING: A LITTLE
HELP NEEDED FOR BATHING: A LITTLE
EATING MEALS: A LITTLE
STANDING UP FROM CHAIR USING ARMS: A LITTLE
CLIMB 3 TO 5 STEPS WITH RAILING: A LITTLE
DRESSING REGULAR LOWER BODY CLOTHING: A LITTLE
DAILY ACTIVITIY SCORE: 21
WALKING IN HOSPITAL ROOM: A LITTLE
PERSONAL GROOMING: A LITTLE
HELP NEEDED FOR BATHING: A LITTLE
MOBILITY SCORE: 21
DAILY ACTIVITIY SCORE: 20

## 2024-10-13 ASSESSMENT — PAIN DESCRIPTION - ORIENTATION
ORIENTATION: LEFT
ORIENTATION: LEFT

## 2024-10-13 ASSESSMENT — PAIN - FUNCTIONAL ASSESSMENT
PAIN_FUNCTIONAL_ASSESSMENT: 0-10

## 2024-10-13 ASSESSMENT — PAIN SCALES - WONG BAKER
WONGBAKER_NUMERICALRESPONSE: HURTS LITTLE BIT
WONGBAKER_NUMERICALRESPONSE: NO HURT

## 2024-10-13 ASSESSMENT — PAIN DESCRIPTION - LOCATION
LOCATION: KNEE
LOCATION: ANKLE

## 2024-10-13 ASSESSMENT — PAIN SCALES - GENERAL
PAINLEVEL_OUTOF10: 0 - NO PAIN
PAINLEVEL_OUTOF10: 3
PAINLEVEL_OUTOF10: 6
PAINLEVEL_OUTOF10: 3
PAINLEVEL_OUTOF10: 0 - NO PAIN
PAINLEVEL_OUTOF10: 0 - NO PAIN

## 2024-10-13 NOTE — PROGRESS NOTES
"  Dez Bonilla is a 69 y.o. male on day 0 of admission presenting with Osteoarthritis.      Assessment / Plan        Dez Bonilla is a 69-year-old male who presented for left knee pain rendering him unable to ambulate.  He was also found to have a rash suspected to be caused by bedbug infestation.  He has been receiving Kenalog and Zyrtec. He is being seen by PT and ambulating without pain. He is pending acceptance to facility as a respite stay.    #Reduced mobility of L knee in the setting of osteoarthritis and cerebral palsy - resolving   Plan:  - Continue pain relief with Voltaren gel, Percocet as needed, and Tylenol as needed  - Continue to await [facility] acceptance.  Currently planned for substance on 10/15/2024.     #Red papular rash over BUE c/f bedbug infestation - resolving  #White macules over BUE  Plan:  - Continue Kenalog and Zyrtec.    Chronic conditions  #T2DM  #HLD  #HTN  Plan:  -Continue patient on atorvastatin.  Patient started on lisinopril 10mg, amlodipine 5mg, and hydralazine 5mg PRN for SBP>160/DBP>110.  - Continue SSI 2.        DVT ppx: Lovenox  IVF: -  Diet: Adult carb controlled  Code: DNR  Consults: Orthopedics       Nikko Rivera MD  PGY-2, Internal Medicine  This is a preliminary note, please await attending attestation for final A/P    Subjective     Patient seen and examined at bedside.  No acute events overnight.      Objective       Physical Exam:  General:  Pleasant and cooperative. No apparent distress.  HEENT:  Normocephalic, atraumatic  Chest:  Clear to auscultation bilaterally. No wheezes, rales, or rhonchi.  CV:  Regular rate and rhythm. No murmurs    Abdomen: Abdomen is soft, non-tender, non-distended. BS +   Extremities:  No lower extremity edema or cyanosis.   Neurological:  AAOx3. No focal deficits.  Skin:  Warm and dry.    Last Recorded Vitals  Blood pressure 117/59, pulse 81, temperature 36.1 °C (97 °F), temperature source Temporal, resp. rate 16, height 1.651 m (5' 5\"), weight " 113 kg (250 lb), SpO2 94%.  Intake/Output last 3 Shifts:  I/O last 3 completed shifts:  In: 221 (1.9 mL/kg) [P.O.:221]  Out: - (0 mL/kg)   Weight: 113.4 kg     Last CBC & BMP  Lab Results   Component Value Date    GLUCOSE 106 (H) 10/11/2024    CALCIUM 8.8 10/11/2024     10/11/2024    K 4.6 10/11/2024    CO2 25 10/11/2024     10/11/2024    BUN 16 10/11/2024    CREATININE 0.94 10/11/2024     Lab Results   Component Value Date    WBC 6.3 10/11/2024    HGB 13.9 10/11/2024    HCT 43.4 10/11/2024    MCV 91 10/11/2024     10/11/2024

## 2024-10-13 NOTE — CARE PLAN
The patient's goals for the shift include      The clinical goals for the shift include Patient will be free from falls during shift

## 2024-10-14 LAB
GLUCOSE BLD MANUAL STRIP-MCNC: 100 MG/DL (ref 74–99)
GLUCOSE BLD MANUAL STRIP-MCNC: 106 MG/DL (ref 74–99)
GLUCOSE BLD MANUAL STRIP-MCNC: 131 MG/DL (ref 74–99)
GLUCOSE BLD MANUAL STRIP-MCNC: 89 MG/DL (ref 74–99)

## 2024-10-14 PROCEDURE — 2500000001 HC RX 250 WO HCPCS SELF ADMINISTERED DRUGS (ALT 637 FOR MEDICARE OP)

## 2024-10-14 PROCEDURE — 2500000001 HC RX 250 WO HCPCS SELF ADMINISTERED DRUGS (ALT 637 FOR MEDICARE OP): Performed by: INTERNAL MEDICINE

## 2024-10-14 PROCEDURE — 99231 SBSQ HOSP IP/OBS SF/LOW 25: CPT | Performed by: STUDENT IN AN ORGANIZED HEALTH CARE EDUCATION/TRAINING PROGRAM

## 2024-10-14 PROCEDURE — 82947 ASSAY GLUCOSE BLOOD QUANT: CPT

## 2024-10-14 PROCEDURE — G0378 HOSPITAL OBSERVATION PER HR: HCPCS

## 2024-10-14 PROCEDURE — 96372 THER/PROPH/DIAG INJ SC/IM: CPT

## 2024-10-14 PROCEDURE — 2500000004 HC RX 250 GENERAL PHARMACY W/ HCPCS (ALT 636 FOR OP/ED)

## 2024-10-14 PROCEDURE — 99231 SBSQ HOSP IP/OBS SF/LOW 25: CPT

## 2024-10-14 RX ADMIN — CETIRIZINE HYDROCHLORIDE 5 MG: 10 TABLET, FILM COATED ORAL at 20:45

## 2024-10-14 RX ADMIN — ATORVASTATIN CALCIUM 40 MG: 40 TABLET, FILM COATED ORAL at 20:45

## 2024-10-14 RX ADMIN — AMLODIPINE BESYLATE 5 MG: 5 TABLET ORAL at 09:47

## 2024-10-14 RX ADMIN — CETIRIZINE HYDROCHLORIDE 5 MG: 10 TABLET, FILM COATED ORAL at 09:47

## 2024-10-14 RX ADMIN — ENOXAPARIN SODIUM 40 MG: 40 INJECTION SUBCUTANEOUS at 09:47

## 2024-10-14 RX ADMIN — ENOXAPARIN SODIUM 40 MG: 40 INJECTION SUBCUTANEOUS at 20:46

## 2024-10-14 RX ADMIN — LISINOPRIL 10 MG: 10 TABLET ORAL at 09:47

## 2024-10-14 RX ADMIN — ACETAMINOPHEN 650 MG: 325 TABLET, FILM COATED ORAL at 16:48

## 2024-10-14 ASSESSMENT — COGNITIVE AND FUNCTIONAL STATUS - GENERAL
STANDING UP FROM CHAIR USING ARMS: A LITTLE
DRESSING REGULAR LOWER BODY CLOTHING: A LITTLE
HELP NEEDED FOR BATHING: A LITTLE
DAILY ACTIVITIY SCORE: 19
WALKING IN HOSPITAL ROOM: A LITTLE
CLIMB 3 TO 5 STEPS WITH RAILING: A LITTLE
PERSONAL GROOMING: A LITTLE
WALKING IN HOSPITAL ROOM: A LITTLE
PERSONAL GROOMING: A LITTLE
TOILETING: A LITTLE
CLIMB 3 TO 5 STEPS WITH RAILING: A LITTLE
DRESSING REGULAR LOWER BODY CLOTHING: A LITTLE
HELP NEEDED FOR BATHING: A LITTLE
MOBILITY SCORE: 21
DAILY ACTIVITIY SCORE: 21
DRESSING REGULAR UPPER BODY CLOTHING: A LITTLE

## 2024-10-14 ASSESSMENT — PAIN SCALES - GENERAL
PAINLEVEL_OUTOF10: 0 - NO PAIN
PAINLEVEL_OUTOF10: 5 - MODERATE PAIN

## 2024-10-14 ASSESSMENT — PAIN - FUNCTIONAL ASSESSMENT
PAIN_FUNCTIONAL_ASSESSMENT: 0-10

## 2024-10-14 ASSESSMENT — PAIN DESCRIPTION - ORIENTATION: ORIENTATION: LEFT;RIGHT

## 2024-10-14 NOTE — CONSULTS
"Nutrition Initial Assessment:   Nutrition Assessment    Reason for Assessment: Length of stay    Patient is a 69 y.o. male presenting with Lt knee pain      Nutrition History:  Food and Nutrient History: Pt was not available for interview.  He has been eating 100% of his meals  Food Allergies/Intolerances:   shellfish  GI Symptoms: None  Oral Problems: None       Anthropometrics:  Height: 165.1 cm (5' 5\")   Weight: 113 kg (250 lb)   BMI (Calculated): 41.6  IBW/kg (Dietitian Calculated): 62 kg  Percent of IBW: 182 %       Weight History:   Wt Readings from Last 10 Encounters:   10/04/24 113 kg (250 lb)         Weight Change %:  Weight History / % Weight Change: unknown at this time    Nutrition Focused Physical Exam Findings:    Subcutaneous Fat Loss:   Orbital Fat Pads:  (pt was not available)  Muscle Wasting:     Edema:  Edema Location: no edema per records  Physical Findings:  Skin: Positive (marks believed to be from bed bugs per sister)    Nutrition Significant Labs:  BMP Trend:   Results from last 7 days   Lab Units 10/11/24  0618 10/10/24  0641 10/08/24  0645   GLUCOSE mg/dL 106* 110* 100*   CALCIUM mg/dL 8.8 8.7 9.0   SODIUM mmol/L 137 135* 137   POTASSIUM mmol/L 4.6 3.8 3.8   CO2 mmol/L 25 26 25   CHLORIDE mmol/L 104 103 103   BUN mg/dL 16 15 12   CREATININE mg/dL 0.94 0.82 0.92        Nutrition Specific Medications:  Norvasc; lipitor; lovenox    I/O:   Last BM Date: 10/09/24; Stool Appearance: Formed, Soft (10/13/24 1018)    Dietary Orders (From admission, onward)       Start     Ordered    10/04/24 1653  Adult diet Regular, Consistent Carb; CCD 75 gm/meal  Diet effective now        Question Answer Comment   Diet type Regular    Diet type Consistent Carb    Carb diet selection: CCD 75 gm/meal        10/04/24 1652                     Estimated Needs:      Method for Estimating Needs: 1736-4972    26-30 cristina kg of IBW     Method for Estimating Needs: 62-74   1-1.2 gm kg of IBW as medically indicated     Method " for Estimating Needs: 1812-5771  20-30 ml kg of IBW as medically indicated        Nutrition Diagnosis        Nutrition Diagnosis  Patient has Nutrition Diagnosis: Yes  Diagnosis Status (1): Ongoing  Nutrition Diagnosis 1: Obese  Related to (1): excessive energy intake and physical inactivity  As Evidenced by (1): obesity grade III     BMI  41.0       Nutrition Interventions/Recommendations         Nutrition Prescription:  Individualized Nutrition Prescription Provided for : Continue 75 gm carb consistent diet so pt gets what he wants.  (recent A1C was 5.9),  monitor for possible supplement need        Nutrition Interventions:   Interventions: Meals and snacks  Meals and Snacks: Carbohydrate-modified diet  Goal: >75% of meals    Collaboration and Referral of Nutrition Care: Collaboration by nutrition professional with other providers    Nutrition Education:      N/A    Nutrition Monitoring and Evaluation   Food/Nutrient Related History Monitoring  Monitoring and Evaluation Plan: Energy intake, Fluid intake, Amount of food  Criteria: >75% of energy needs  Criteria: adequate fluid intake without fluid overload  Criteria: >75% of meals    Body Composition/Growth/Weight History  Monitoring and Evaluation Plan: Weight    Biochemical Data, Medical Tests and Procedures  Monitoring and Evaluation Plan: Electrolyte/renal panel, Glucose/endocrine profile  Criteria: improved sodium    stable BMP  Criteria: glucose within desired range              Time Spent (min): 45 minutes

## 2024-10-14 NOTE — CARE PLAN
The patient's goals for the shift include      The clinical goals for the shift include pt will remain safe and free from falls and injury throughout the shift    Over the shift, the patient made progress toward the following goals.

## 2024-10-14 NOTE — CARE PLAN
The patient's goals for the shift include  safey    The clinical goals for the shift include patient will be safe and free of injury throughout night shift    Over the shift, the patient did not make progress toward the following goals.  Recommendations to address these barriers include continue to monitor.

## 2024-10-14 NOTE — PROGRESS NOTES
Dez Bonilla is a 69 y.o. male on day 0 of admission presenting with Osteoarthritis.      Assessment / Plan        Dez Bonilla is a 69-year-old male who presented for left knee pain rendering him unable to ambulate.  He was also found to have a rash suspected to be caused by bedbug infestation.  He has been receiving Kenalog and Zyrtec. He is being seen by PT and ambulating without pain. He is pending acceptance to facility as a respite stay.    #Reduced mobility of L knee in the setting of osteoarthritis and cerebral palsy - resolving   Plan:  - Continue pain relief with Voltaren gel, Percocet as needed, and Tylenol as needed  - Continue to await [facility] acceptance.  Currently planned for substance on 10/15/2024.    #Red papular rash over BUE c/f bedbug infestation - resolving  #White macules over BUE  Plan:  - Continue Kenalog and Zyrtec.    Chronic conditions  #T2DM  #HLD  #HTN  Plan:  -Continue patient on atorvastatin.  Patient started on lisinopril 10mg, amlodipine 5mg, and hydralazine 5mg PRN for SBP>160/DBP>110.  - Continue SSI 2.        DVT ppx: Lovenox  IVF: -  Diet: Adult carb controlled  Code: DNR  Consults: Orthopedics       Noel Bunch MD   PGY-1, Internal Medicine  This is a preliminary note, please await attending attestation for final A/P    Subjective     Patient seen and examined. No acute overnight events. Patient says he feels mild pain in both of his knees and attributes it to the weather. He is otherwise ambulating without difficulty.      Objective       Physical Exam:  General:  Pleasant and cooperative. No apparent distress.  HEENT:  Normocephalic, atraumatic  Chest:  Clear to auscultation bilaterally. No wheezes, rales, or rhonchi.  CV:  Regular rate and rhythm. No murmurs    Abdomen: Abdomen is soft, non-tender, non-distended. BS +   Extremities:  No lower extremity edema or cyanosis.   Neurological:  AAOx3. No focal deficits.  Skin:  Warm and dry.    Last Recorded Vitals  Blood  "pressure 127/65, pulse 83, temperature 36.4 °C (97.5 °F), temperature source Temporal, resp. rate 18, height 1.651 m (5' 5\"), weight 113 kg (250 lb), SpO2 96%.  Intake/Output last 3 Shifts:  I/O last 3 completed shifts:  In: 877 (7.7 mL/kg) [P.O.:877]  Out: 200 (1.8 mL/kg) [Urine:200 (0 mL/kg/hr)]  Weight: 113.4 kg     Last CBC & BMP  Lab Results   Component Value Date    GLUCOSE 106 (H) 10/11/2024    CALCIUM 8.8 10/11/2024     10/11/2024    K 4.6 10/11/2024    CO2 25 10/11/2024     10/11/2024    BUN 16 10/11/2024    CREATININE 0.94 10/11/2024     Lab Results   Component Value Date    WBC 6.3 10/11/2024    HGB 13.9 10/11/2024    HCT 43.4 10/11/2024    MCV 91 10/11/2024     10/11/2024           "

## 2024-10-15 VITALS
HEART RATE: 67 BPM | DIASTOLIC BLOOD PRESSURE: 67 MMHG | SYSTOLIC BLOOD PRESSURE: 130 MMHG | WEIGHT: 250 LBS | TEMPERATURE: 97.3 F | HEIGHT: 65 IN | RESPIRATION RATE: 16 BRPM | BODY MASS INDEX: 41.65 KG/M2 | OXYGEN SATURATION: 92 %

## 2024-10-15 LAB — GLUCOSE BLD MANUAL STRIP-MCNC: 96 MG/DL (ref 74–99)

## 2024-10-15 PROCEDURE — 2500000001 HC RX 250 WO HCPCS SELF ADMINISTERED DRUGS (ALT 637 FOR MEDICARE OP): Performed by: INTERNAL MEDICINE

## 2024-10-15 PROCEDURE — 2500000001 HC RX 250 WO HCPCS SELF ADMINISTERED DRUGS (ALT 637 FOR MEDICARE OP)

## 2024-10-15 PROCEDURE — G0378 HOSPITAL OBSERVATION PER HR: HCPCS

## 2024-10-15 PROCEDURE — 82947 ASSAY GLUCOSE BLOOD QUANT: CPT

## 2024-10-15 PROCEDURE — 99238 HOSP IP/OBS DSCHRG MGMT 30/<: CPT

## 2024-10-15 RX ORDER — LIDOCAINE 560 MG/1
1 PATCH PERCUTANEOUS; TOPICAL; TRANSDERMAL DAILY
Qty: 14 PATCH | Refills: 0 | Status: SHIPPED | OUTPATIENT
Start: 2024-10-15 | End: 2024-10-29

## 2024-10-15 RX ORDER — LISINOPRIL 10 MG/1
10 TABLET ORAL DAILY
Qty: 30 TABLET | Refills: 0 | Status: SHIPPED | OUTPATIENT
Start: 2024-10-15 | End: 2024-11-14

## 2024-10-15 RX ORDER — CETIRIZINE HYDROCHLORIDE 5 MG/1
5 TABLET ORAL 2 TIMES DAILY
Qty: 14 TABLET | Refills: 0 | Status: SHIPPED | OUTPATIENT
Start: 2024-10-15 | End: 2024-10-22

## 2024-10-15 RX ORDER — ATORVASTATIN CALCIUM 40 MG/1
40 TABLET, FILM COATED ORAL NIGHTLY
Qty: 30 TABLET | Refills: 0 | Status: SHIPPED | OUTPATIENT
Start: 2024-10-15 | End: 2024-11-14

## 2024-10-15 RX ORDER — AMLODIPINE BESYLATE 5 MG/1
5 TABLET ORAL DAILY
Qty: 30 TABLET | Refills: 0 | Status: SHIPPED | OUTPATIENT
Start: 2024-10-15 | End: 2024-11-14

## 2024-10-15 RX ADMIN — CETIRIZINE HYDROCHLORIDE 5 MG: 10 TABLET, FILM COATED ORAL at 09:31

## 2024-10-15 RX ADMIN — AMLODIPINE BESYLATE 5 MG: 5 TABLET ORAL at 09:31

## 2024-10-15 RX ADMIN — LISINOPRIL 10 MG: 10 TABLET ORAL at 09:31

## 2024-10-15 RX ADMIN — ACETAMINOPHEN 650 MG: 325 TABLET, FILM COATED ORAL at 09:31

## 2024-10-15 ASSESSMENT — COGNITIVE AND FUNCTIONAL STATUS - GENERAL
DAILY ACTIVITIY SCORE: 18
EATING MEALS: A LITTLE
MOBILITY SCORE: 21
DRESSING REGULAR LOWER BODY CLOTHING: A LITTLE
CLIMB 3 TO 5 STEPS WITH RAILING: A LITTLE
STANDING UP FROM CHAIR USING ARMS: A LITTLE
HELP NEEDED FOR BATHING: A LITTLE
PERSONAL GROOMING: A LITTLE
DRESSING REGULAR UPPER BODY CLOTHING: A LITTLE
WALKING IN HOSPITAL ROOM: A LITTLE
TOILETING: A LITTLE

## 2024-10-15 ASSESSMENT — PAIN SCALES - GENERAL
PAINLEVEL_OUTOF10: 0 - NO PAIN
PAINLEVEL_OUTOF10: 3

## 2024-10-15 ASSESSMENT — PAIN - FUNCTIONAL ASSESSMENT
PAIN_FUNCTIONAL_ASSESSMENT: 0-10
PAIN_FUNCTIONAL_ASSESSMENT: 0-10

## 2024-10-15 ASSESSMENT — PAIN DESCRIPTION - ORIENTATION: ORIENTATION: LEFT

## 2024-10-15 NOTE — CARE PLAN
Pt is calling to find out if he might have a metabolic syndrome and could that be the cause of some of his health issues.   The patient's goals for the shift include      The clinical goals for the shift include pt will remain safe and free from falls and injury    Over the shift, the patient made progress toward the following goals.

## 2024-10-15 NOTE — HOSPITAL COURSE
Patient is a 69-year-old male with PMHx of cerebral palsy, developmental delay, HLD, osteoarthritis, testosterone deficiency, and T2DM who presented to the ED on 10/4/2024 for left knee pain starting that same morning.  He is also noted to have a red papular rash and white macules on his arms, and his sister informed the primary team that he has bedbugs in his apartment.  The patient in his room were decontaminated and he was treated for osteoarthritis reduced mobility in the setting of his osteoarthritis and cerebral palsy.  Orthopedic surgery saw the patient and decided that conservative treatment was the best approach, with follow-up after discharge.  He was treated for his red papular rash with Kenalog and Zyrtec, which appeared to improve it partially.  His left knee pain resolved almost entirely and he was able to ambulate with little difficulty.  He remained to the hospital while awaiting placement as a respite stay.  He was accepted by [facility] and discharged there on 10/15/2024.

## 2024-10-15 NOTE — DISCHARGE SUMMARY
Discharge Diagnosis  Reduced mobility of L knee in the setting of osteoarthritis and cerebral palsy   Red papular rash over BUE c/f bedbug infestation - resolving  White macules over BUE    Issues Requiring Follow-Up  Follow up with PCP after discharge.  Follow up with orthopedics.    Discharge Meds     Medication List      START taking these medications     amLODIPine 5 mg tablet; Commonly known as: Norvasc; Take 1 tablet (5 mg)   by mouth once daily.   atorvastatin 40 mg tablet; Commonly known as: Lipitor; Take 1 tablet (40   mg) by mouth once daily at bedtime.   cetirizine 5 mg tablet; Commonly known as: ZyrTEC; Take 1 tablet (5 mg)   by mouth 2 times a day for 7 days.   lidocaine 4 % patch; Place 1 patch over 12 hours on the skin once daily   for 14 days. Remove & discard patch within 12 hours or as directed by MD.    Left knee   lisinopril 10 mg tablet; Take 1 tablet (10 mg) by mouth once daily.     CONTINUE taking these medications     triamcinolone 0.1 % cream; Commonly known as: Kenalog     STOP taking these medications     predniSONE 10 mg tablet; Commonly known as: Deltasone       Test Results Pending At Discharge  Pending Labs       No current pending labs.            Hospital Course  Patient is a 69-year-old male with PMHx of cerebral palsy, developmental delay, HLD, osteoarthritis, testosterone deficiency, and T2DM who presented to the ED on 10/4/2024 for left knee pain starting that same morning.  He is also noted to have a red papular rash and white macules on his arms, and his sister informed the primary team that he has bedbugs in his apartment.  The patient in his room were decontaminated and he was treated for osteoarthritis reduced mobility in the setting of his osteoarthritis and cerebral palsy.  Orthopedic surgery saw the patient and decided that conservative treatment was the best approach, with follow-up after discharge.  He was treated for his red papular rash with Kenalog and Zyrtec, which  appeared to improve it partially.  His left knee pain resolved almost entirely and he was able to ambulate with little difficulty.  He remained to the hospital while awaiting placement as a respite stay.  He was accepted by [facility] and discharged there on 10/15/2024.    Pertinent Physical Exam At Time of Discharge  Physical Exam  General: alert and oriented. No acute distress.  HEENT: oral mucosa moist, EOMI. No JVD.   CHEST: clear breath sounds, no crackles, no wheeze, no tachypnea.  CVS: regular rate and rhythm, no murmurs.   ABD: Soft, Non Tender, BS present.  EXT: Red papular rash on BUE, improved from admission.  SKIN: no rash.  NEURO: Nonfocal grossly.    Outpatient Follow-Up  PCP, tom Bunch MD

## 2024-10-15 NOTE — NURSING NOTE
Report called to SABAS Rain at [facility] at 1025. Sister is present and will transfer him to the facility.

## 2024-10-15 NOTE — CARE PLAN
The patient's goals for the shift include  Rest    The clinical goals for the shift include Patient will be free from injury during shift      Problem: Fall/Injury  Goal: Not fall by end of shift  Outcome: Progressing  Goal: Be free from injury by end of the shift  Outcome: Progressing  Goal: Verbalize understanding of personal risk factors for fall in the hospital  Outcome: Progressing  Goal: Verbalize understanding of risk factor reduction measures to prevent injury from fall in the home  Outcome: Progressing     Problem: Skin  Goal: Promote/optimize nutrition  Outcome: Progressing  Goal: Decreased wound size/increased tissue granulation at next dressing change  Outcome: Progressing

## 2024-10-16 ENCOUNTER — NURSING HOME VISIT (OUTPATIENT)
Dept: POST ACUTE CARE | Facility: EXTERNAL LOCATION | Age: 69
End: 2024-10-16
Payer: COMMERCIAL

## 2024-10-16 DIAGNOSIS — R09.81 CHRONIC NASAL CONGESTION: ICD-10-CM

## 2024-10-16 DIAGNOSIS — I15.9 SECONDARY HYPERTENSION: ICD-10-CM

## 2024-10-16 DIAGNOSIS — E78.5 HYPERLIPIDEMIA, UNSPECIFIED HYPERLIPIDEMIA TYPE: Primary | ICD-10-CM

## 2024-10-16 DIAGNOSIS — Z78.9 SELF-CARE DEFICIT: ICD-10-CM

## 2024-10-16 PROCEDURE — 99350 HOME/RES VST EST HIGH MDM 60: CPT | Performed by: NURSE PRACTITIONER

## 2024-10-16 NOTE — PROGRESS NOTES
PROGRESS NOTE    Subjective   Chief complaint: Dez Bonilla is a 69 y.o. male who is an assisted living facility patient being seen and evaluated for recent admission to assisted living     HPI: Has been recently admitted to this facility.  Visited him in his apartment. Is social and talkative. Telling jokes and is interested in trivia.  He is oriented. X4.   Reports that he used to work as a . Is ambulatory in his room with walker.   His gait is slow and steady. Recommended therapy to assess.   He denies any pain or discomfort.    Poor historian regarding his medical history. Denies any chest pain or tightness.   Reports that he has a good appetite. Denies any gastric distress. Denies any difficulty voiding. Denies any constipation.   Will order labs, medical history reviewed.   He is interested in attending activity programs in the community.   Nursing reports that he is dining in the dining room for all meals.     HPI  Objective   Vital signs: 110/68-86-18-97.3   Current weight - 247pounds      Physical Exam  Vitals and nursing note reviewed.   Constitutional:       General: He is not in acute distress.     Appearance: He is well-groomed. He is obese.   Eyes:      Extraocular Movements: Extraocular movements intact.   Cardiovascular:      Rate and Rhythm: Normal rate and regular rhythm.   Pulmonary:      Effort: Pulmonary effort is normal.      Breath sounds: Normal breath sounds.      Comments: Lungs clear   Abdominal:      General: Bowel sounds are normal.      Palpations: Abdomen is soft.   Musculoskeletal:      Cervical back: Neck supple.      Right lower leg: No edema.      Left lower leg: No edema.   Neurological:      Mental Status: He is alert.   Psychiatric:         Attention and Perception: Attention normal.         Mood and Affect: Mood and affect normal.         Speech: Speech normal.         Behavior: Behavior is cooperative.         Cognition and Memory: Cognition normal.          Assessment/Plan   Problem List Items Addressed This Visit       Hyperlipidemia - Primary     Atorvastatin   Monitor labs          Chronic nasal congestion     Reports nasal congestion  Remains on cetirizine daily   Monitor symptoms          Self-care deficit     Staff to assist as required   Has slow gait - walker   Recommend therapy to assess          Hypertension     Amlodipine   Monitor BP   Monitor labs   Denies any chest pain or tightness             Medications, treatments, and labs reviewed  Continue medications and treatments as listed in EMR    Melina Mane, APRN-CNP

## 2024-10-16 NOTE — LETTER
Patient: Dez Bonilla  : 1955    Encounter Date: 10/16/2024    PROGRESS NOTE    Subjective  Chief complaint: Dez Bonilla is a 69 y.o. male who is an assisted living facility patient being seen and evaluated for recent admission to assisted living     HPI: Has been recently admitted to this facility.  Visited him in his apartment. Is social and talkative. Telling jokes and is interested in trivia.  He is oriented. X4.   Reports that he used to work as a . Is ambulatory in his room with walker.   His gait is slow and steady. Recommended therapy to assess.   He denies any pain or discomfort.    Poor historian regarding his medical history. Denies any chest pain or tightness.   Reports that he has a good appetite. Denies any gastric distress. Denies any difficulty voiding. Denies any constipation.   Will order labs, medical history reviewed.   He is interested in attending activity programs in the community.   Nursing reports that he is dining in the dining room for all meals.     HPI  Objective  Vital signs: 110/68-86-18-97.3   Current weight - 247pounds      Physical Exam  Vitals and nursing note reviewed.   Constitutional:       General: He is not in acute distress.     Appearance: He is well-groomed. He is obese.   Eyes:      Extraocular Movements: Extraocular movements intact.   Cardiovascular:      Rate and Rhythm: Normal rate and regular rhythm.   Pulmonary:      Effort: Pulmonary effort is normal.      Breath sounds: Normal breath sounds.      Comments: Lungs clear   Abdominal:      General: Bowel sounds are normal.      Palpations: Abdomen is soft.   Musculoskeletal:      Cervical back: Neck supple.      Right lower leg: No edema.      Left lower leg: No edema.   Neurological:      Mental Status: He is alert.   Psychiatric:         Attention and Perception: Attention normal.         Mood and Affect: Mood and affect normal.         Speech: Speech normal.         Behavior: Behavior is  cooperative.         Cognition and Memory: Cognition normal.         Assessment/Plan  Problem List Items Addressed This Visit       Hyperlipidemia - Primary     Atorvastatin   Monitor labs          Chronic nasal congestion     Reports nasal congestion  Remains on cetirizine daily   Monitor symptoms          Self-care deficit     Staff to assist as required   Has slow gait - walker   Recommend therapy to assess          Hypertension     Amlodipine   Monitor BP   Monitor labs   Denies any chest pain or tightness             Medications, treatments, and labs reviewed  Continue medications and treatments as listed in EMR    KEYA Diop      Electronically Signed By: KEYA Diop   10/19/24 11:31 AM

## 2024-10-19 PROBLEM — E78.5 HYPERLIPIDEMIA: Status: ACTIVE | Noted: 2024-10-19

## 2024-10-19 PROBLEM — I10 HYPERTENSION: Status: ACTIVE | Noted: 2024-10-19

## 2024-10-19 PROBLEM — R09.81 CHRONIC NASAL CONGESTION: Status: ACTIVE | Noted: 2024-10-19

## 2024-10-19 PROBLEM — Z78.9 SELF-CARE DEFICIT: Status: ACTIVE | Noted: 2024-10-19

## 2024-11-13 ENCOUNTER — NURSING HOME VISIT (OUTPATIENT)
Dept: POST ACUTE CARE | Facility: EXTERNAL LOCATION | Age: 69
End: 2024-11-13
Payer: COMMERCIAL

## 2024-11-13 DIAGNOSIS — E78.5 HYPERLIPIDEMIA, UNSPECIFIED HYPERLIPIDEMIA TYPE: ICD-10-CM

## 2024-11-13 DIAGNOSIS — Z78.9 SELF-CARE DEFICIT: Primary | ICD-10-CM

## 2024-11-13 DIAGNOSIS — R09.81 CHRONIC NASAL CONGESTION: ICD-10-CM

## 2024-11-13 DIAGNOSIS — M19.90 OSTEOARTHRITIS, UNSPECIFIED OSTEOARTHRITIS TYPE, UNSPECIFIED SITE: ICD-10-CM

## 2024-11-13 DIAGNOSIS — I15.9 SECONDARY HYPERTENSION: ICD-10-CM

## 2024-11-13 PROCEDURE — 99350 HOME/RES VST EST HIGH MDM 60: CPT | Performed by: NURSE PRACTITIONER

## 2024-11-13 NOTE — LETTER
Patient: Dez Bonilla  : 1955    Encounter Date: 2024    PROGRESS NOTE    Subjective  Chief complaint: Dez Bonilla is a 69 y.o. male who is an assisted living facility patient being seen and evaluated for monthly medical care    HPI: Social and talkative. Is sitting in lounge lisandra watching TV - has feet elevated.    He reports that he is doing well, stating that he feels good.   Likes the food, reports that he has a good appetite.   Denies any chest pain or tightness. Denies any gastric distress. Denies any difficulty voiding. Denies any constipation.   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   He reports that he goes to some of the activities. Encouraged him to go to the exercise programs.    Is ambulatory throughout the facility.    Nursing reports that he is doing well, denies any constitutional symptoms.  Reviewed medications       HPI  Objective  Vital signs: 110/68-86-18-97.3   Current weight- 266 pounds     Physical Exam  Vitals and nursing note reviewed.   Constitutional:       General: He is not in acute distress.     Appearance: He is well-groomed. He is obese.   Eyes:      Extraocular Movements: Extraocular movements intact.   Cardiovascular:      Rate and Rhythm: Normal rate and regular rhythm.   Pulmonary:      Effort: Pulmonary effort is normal.      Breath sounds: Normal breath sounds.      Comments: Lungs clear   Abdominal:      General: Bowel sounds are normal.      Palpations: Abdomen is soft.   Musculoskeletal:      Cervical back: Neck supple.      Right lower le+ Edema present.      Left lower le+ Edema present.   Neurological:      Mental Status: He is alert.   Psychiatric:         Attention and Perception: Attention normal.         Mood and Affect: Mood and affect normal.         Speech: Speech normal.         Behavior: Behavior is cooperative.         Cognition and Memory: Cognition normal.         Assessment/Plan  Problem List Items Addressed This  Visit       Osteoarthritis     Reports chronic arthritis   No inflammation in joints   Tylenol effective          Hyperlipidemia     Atorvastatin   Monitor labs          Chronic nasal congestion     Reports nasal congestion  Remains on cetirizine daily   Monitor symptoms          Self-care deficit - Primary     Staff to assist as required   Has slow gait - walker   Recommend therapy to assess          Hypertension     Amlodipine   Monitor BP   Monitor labs   Denies any chest pain or tightness             Medications, treatments, and labs reviewed  Continue medications and treatments as listed in EMR    KEYA Diop      Electronically Signed By: KEYA Diop   11/17/24 10:09 AM

## 2024-11-17 NOTE — PROGRESS NOTES
PROGRESS NOTE    Subjective   Chief complaint: Dez Bonilla is a 69 y.o. male who is an assisted living facility patient being seen and evaluated for monthly medical care    HPI: Social and talkative. Is sitting in lounge lisandra watching TV - has feet elevated.    He reports that he is doing well, stating that he feels good.   Likes the food, reports that he has a good appetite.   Denies any chest pain or tightness. Denies any gastric distress. Denies any difficulty voiding. Denies any constipation.   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   He reports that he goes to some of the activities. Encouraged him to go to the exercise programs.    Is ambulatory throughout the facility.    Nursing reports that he is doing well, denies any constitutional symptoms.  Reviewed medications       HPI  Objective   Vital signs: 110/68-86-18-97.3   Current weight- 266 pounds     Physical Exam  Vitals and nursing note reviewed.   Constitutional:       General: He is not in acute distress.     Appearance: He is well-groomed. He is obese.   Eyes:      Extraocular Movements: Extraocular movements intact.   Cardiovascular:      Rate and Rhythm: Normal rate and regular rhythm.   Pulmonary:      Effort: Pulmonary effort is normal.      Breath sounds: Normal breath sounds.      Comments: Lungs clear   Abdominal:      General: Bowel sounds are normal.      Palpations: Abdomen is soft.   Musculoskeletal:      Cervical back: Neck supple.      Right lower le+ Edema present.      Left lower le+ Edema present.   Neurological:      Mental Status: He is alert.   Psychiatric:         Attention and Perception: Attention normal.         Mood and Affect: Mood and affect normal.         Speech: Speech normal.         Behavior: Behavior is cooperative.         Cognition and Memory: Cognition normal.         Assessment/Plan   Problem List Items Addressed This Visit       Osteoarthritis     Reports chronic arthritis   No  inflammation in joints   Tylenol effective          Hyperlipidemia     Atorvastatin   Monitor labs          Chronic nasal congestion     Reports nasal congestion  Remains on cetirizine daily   Monitor symptoms          Self-care deficit - Primary     Staff to assist as required   Has slow gait - walker   Recommend therapy to assess          Hypertension     Amlodipine   Monitor BP   Monitor labs   Denies any chest pain or tightness             Medications, treatments, and labs reviewed  Continue medications and treatments as listed in EMR    Melina Mane, APRN-CNP

## 2024-12-18 ENCOUNTER — NURSING HOME VISIT (OUTPATIENT)
Dept: POST ACUTE CARE | Facility: EXTERNAL LOCATION | Age: 69
End: 2024-12-18
Payer: COMMERCIAL

## 2024-12-18 DIAGNOSIS — I15.9 SECONDARY HYPERTENSION: ICD-10-CM

## 2024-12-18 DIAGNOSIS — Z78.9 SELF-CARE DEFICIT: Primary | ICD-10-CM

## 2024-12-18 DIAGNOSIS — E78.5 HYPERLIPIDEMIA, UNSPECIFIED HYPERLIPIDEMIA TYPE: ICD-10-CM

## 2024-12-18 DIAGNOSIS — M19.90 OSTEOARTHRITIS, UNSPECIFIED OSTEOARTHRITIS TYPE, UNSPECIFIED SITE: ICD-10-CM

## 2024-12-18 NOTE — LETTER
Patient: Dez Bonilla  : 1955    Encounter Date: 2024    PROGRESS NOTE    Subjective  Chief complaint: Dez Bonilla is a 69 y.o. male who is an assisted living facility patient being seen and evaluated for monthly medical care.     HPI: Visited him in he is room. Just finished lunch. Reports that he always has a heathy appetite.    Is watching TV. Talkative about trivia and tells jokes. Is ambulatory in his apartment. Reports that he goes to the exercise classes. Is oriented to his surroundings and the month and season. Is repetitive.    Denies any pain or discomfort. Denies any chest pain or tightness. Denies gastric distress. Reports that he is voiding well without any difficulty.  He reports that he was seen by therapy. Indicates that he was doing some arm exercises. Nursing denies any constitutional symptoms, remains afebrile. Nursing reports that he is cooperative with all care.   Reviewed medications   HPI  Objective  Vital signs: 126/76-78-16-98.2   Weight 271.8     Physical Exam  Vitals and nursing note reviewed.   Constitutional:       General: He is not in acute distress.     Appearance: He is well-groomed. He is obese.   Eyes:      Extraocular Movements: Extraocular movements intact.   Cardiovascular:      Rate and Rhythm: Normal rate and regular rhythm.   Pulmonary:      Effort: Pulmonary effort is normal.      Breath sounds: Normal breath sounds.      Comments: Lungs clear   Abdominal:      General: Bowel sounds are normal.      Palpations: Abdomen is soft.   Musculoskeletal:      Cervical back: Neck supple.      Right lower le+ Edema present.      Left lower le+ Edema present.   Neurological:      Mental Status: He is alert.   Psychiatric:         Attention and Perception: Attention normal.         Mood and Affect: Mood and affect normal.         Speech: Speech normal.         Behavior: Behavior is cooperative.         Cognition and Memory: Cognition normal.          Assessment/Plan  Problem List Items Addressed This Visit       Osteoarthritis     Reports chronic arthritis   No inflammation in joints   Tylenol effective          Hyperlipidemia     Atorvastatin   Monitor labs          Self-care deficit - Primary     Staff to assist as required   Has slow gait - walker   Has been evaluated by therapy' encourage to go to exercise classes         Hypertension     Amlodipine   Monitor BP, BP at goal    Monitor labs   Denies any chest pain or tightness             Medications, treatments, and labs reviewed  Continue medications and treatments as listed in EMR    KEYA Diop      Electronically Signed By: KEYA Diop   12/20/24  9:09 PM

## 2024-12-19 NOTE — PROGRESS NOTES
PROGRESS NOTE    Subjective   Chief complaint: Dez Bonilla is a 69 y.o. male who is an assisted living facility patient being seen and evaluated for monthly medical care.     HPI: Visited him in he is room. Just finished lunch. Reports that he always has a heathy appetite.    Is watching TV. Talkative about trivia and tells jokes. Is ambulatory in his apartment. Reports that he goes to the exercise classes. Is oriented to his surroundings and the month and season. Is repetitive.    Denies any pain or discomfort. Denies any chest pain or tightness. Denies gastric distress. Reports that he is voiding well without any difficulty.  He reports that he was seen by therapy. Indicates that he was doing some arm exercises. Nursing denies any constitutional symptoms, remains afebrile. Nursing reports that he is cooperative with all care.   Reviewed medications   HPI  Objective   Vital signs: 126/76-78-16-98.2   Weight 271.8     Physical Exam  Vitals and nursing note reviewed.   Constitutional:       General: He is not in acute distress.     Appearance: He is well-groomed. He is obese.   Eyes:      Extraocular Movements: Extraocular movements intact.   Cardiovascular:      Rate and Rhythm: Normal rate and regular rhythm.   Pulmonary:      Effort: Pulmonary effort is normal.      Breath sounds: Normal breath sounds.      Comments: Lungs clear   Abdominal:      General: Bowel sounds are normal.      Palpations: Abdomen is soft.   Musculoskeletal:      Cervical back: Neck supple.      Right lower le+ Edema present.      Left lower le+ Edema present.   Neurological:      Mental Status: He is alert.   Psychiatric:         Attention and Perception: Attention normal.         Mood and Affect: Mood and affect normal.         Speech: Speech normal.         Behavior: Behavior is cooperative.         Cognition and Memory: Cognition normal.         Assessment/Plan   Problem List Items Addressed This Visit       Osteoarthritis      Reports chronic arthritis   No inflammation in joints   Tylenol effective          Hyperlipidemia     Atorvastatin   Monitor labs          Self-care deficit - Primary     Staff to assist as required   Has slow gait - walker   Has been evaluated by therapy' encourage to go to exercise classes         Hypertension     Amlodipine   Monitor BP, BP at goal    Monitor labs   Denies any chest pain or tightness             Medications, treatments, and labs reviewed  Continue medications and treatments as listed in EMR    Melina Mane, APRN-CNP

## 2024-12-21 NOTE — ASSESSMENT & PLAN NOTE
Staff to assist as required   Has slow gait - walker   Has been evaluated by therapy' encourage to go to exercise classes